# Patient Record
Sex: MALE | Race: WHITE | NOT HISPANIC OR LATINO | URBAN - METROPOLITAN AREA
[De-identification: names, ages, dates, MRNs, and addresses within clinical notes are randomized per-mention and may not be internally consistent; named-entity substitution may affect disease eponyms.]

---

## 2017-12-14 ENCOUNTER — EMERGENCY (EMERGENCY)
Facility: HOSPITAL | Age: 62
LOS: 1 days | Discharge: HOME | End: 2017-12-14
Attending: EMERGENCY MEDICINE
Payer: COMMERCIAL

## 2017-12-14 VITALS
RESPIRATION RATE: 20 BRPM | OXYGEN SATURATION: 97 % | HEIGHT: 72 IN | SYSTOLIC BLOOD PRESSURE: 148 MMHG | HEART RATE: 84 BPM | TEMPERATURE: 98 F | WEIGHT: 259.93 LBS | DIASTOLIC BLOOD PRESSURE: 102 MMHG

## 2017-12-14 VITALS
OXYGEN SATURATION: 97 % | DIASTOLIC BLOOD PRESSURE: 97 MMHG | TEMPERATURE: 98 F | HEART RATE: 83 BPM | SYSTOLIC BLOOD PRESSURE: 140 MMHG | RESPIRATION RATE: 16 BRPM

## 2017-12-14 LAB
ALBUMIN SERPL ELPH-MCNC: 3.4 G/DL — LOW (ref 3.5–5)
ALP SERPL-CCNC: 102 U/L — SIGNIFICANT CHANGE UP (ref 40–120)
ALT FLD-CCNC: 26 U/L DA — SIGNIFICANT CHANGE UP (ref 10–60)
ANION GAP SERPL CALC-SCNC: 9 MMOL/L — SIGNIFICANT CHANGE UP (ref 5–17)
APTT BLD: 33.5 SEC — SIGNIFICANT CHANGE UP (ref 27.5–37.4)
AST SERPL-CCNC: 17 U/L — SIGNIFICANT CHANGE UP (ref 10–40)
BASOPHILS # BLD AUTO: 0.1 K/UL — SIGNIFICANT CHANGE UP (ref 0–0.2)
BASOPHILS NFR BLD AUTO: 0.8 % — SIGNIFICANT CHANGE UP (ref 0–2)
BILIRUB SERPL-MCNC: 0.5 MG/DL — SIGNIFICANT CHANGE UP (ref 0.2–1.2)
BUN SERPL-MCNC: 10 MG/DL — SIGNIFICANT CHANGE UP (ref 7–18)
CALCIUM SERPL-MCNC: 8.6 MG/DL — SIGNIFICANT CHANGE UP (ref 8.4–10.5)
CHLORIDE SERPL-SCNC: 106 MMOL/L — SIGNIFICANT CHANGE UP (ref 96–108)
CO2 SERPL-SCNC: 25 MMOL/L — SIGNIFICANT CHANGE UP (ref 22–31)
CREAT SERPL-MCNC: 0.83 MG/DL — SIGNIFICANT CHANGE UP (ref 0.5–1.3)
EOSINOPHIL # BLD AUTO: 0.2 K/UL — SIGNIFICANT CHANGE UP (ref 0–0.5)
EOSINOPHIL NFR BLD AUTO: 2.7 % — SIGNIFICANT CHANGE UP (ref 0–6)
GLUCOSE SERPL-MCNC: 106 MG/DL — HIGH (ref 70–99)
HCT VFR BLD CALC: 52.8 % — HIGH (ref 39–50)
HGB BLD-MCNC: 16.8 G/DL — SIGNIFICANT CHANGE UP (ref 13–17)
INR BLD: 1.11 RATIO — SIGNIFICANT CHANGE UP (ref 0.88–1.16)
LYMPHOCYTES # BLD AUTO: 2.6 K/UL — SIGNIFICANT CHANGE UP (ref 1–3.3)
LYMPHOCYTES # BLD AUTO: 29.4 % — SIGNIFICANT CHANGE UP (ref 13–44)
MCHC RBC-ENTMCNC: 30.7 PG — SIGNIFICANT CHANGE UP (ref 27–34)
MCHC RBC-ENTMCNC: 31.8 GM/DL — LOW (ref 32–36)
MCV RBC AUTO: 96.4 FL — SIGNIFICANT CHANGE UP (ref 80–100)
MONOCYTES # BLD AUTO: 0.6 K/UL — SIGNIFICANT CHANGE UP (ref 0–0.9)
MONOCYTES NFR BLD AUTO: 6.3 % — SIGNIFICANT CHANGE UP (ref 2–14)
NEUTROPHILS # BLD AUTO: 5.4 K/UL — SIGNIFICANT CHANGE UP (ref 1.8–7.4)
NEUTROPHILS NFR BLD AUTO: 60.8 % — SIGNIFICANT CHANGE UP (ref 43–77)
PLATELET # BLD AUTO: 159 K/UL — SIGNIFICANT CHANGE UP (ref 150–400)
POTASSIUM SERPL-MCNC: 4.3 MMOL/L — SIGNIFICANT CHANGE UP (ref 3.5–5.3)
POTASSIUM SERPL-SCNC: 4.3 MMOL/L — SIGNIFICANT CHANGE UP (ref 3.5–5.3)
PROT SERPL-MCNC: 7.6 G/DL — SIGNIFICANT CHANGE UP (ref 6–8.3)
PROTHROM AB SERPL-ACNC: 12.1 SEC — SIGNIFICANT CHANGE UP (ref 9.8–12.7)
RBC # BLD: 5.48 M/UL — SIGNIFICANT CHANGE UP (ref 4.2–5.8)
RBC # FLD: 12.6 % — SIGNIFICANT CHANGE UP (ref 10.3–14.5)
SODIUM SERPL-SCNC: 140 MMOL/L — SIGNIFICANT CHANGE UP (ref 135–145)
TROPONIN I SERPL-MCNC: <0.015 NG/ML — SIGNIFICANT CHANGE UP (ref 0–0.04)
WBC # BLD: 8.9 K/UL — SIGNIFICANT CHANGE UP (ref 3.8–10.5)
WBC # FLD AUTO: 8.9 K/UL — SIGNIFICANT CHANGE UP (ref 3.8–10.5)

## 2017-12-14 PROCEDURE — 70450 CT HEAD/BRAIN W/O DYE: CPT

## 2017-12-14 PROCEDURE — 85610 PROTHROMBIN TIME: CPT

## 2017-12-14 PROCEDURE — 71046 X-RAY EXAM CHEST 2 VIEWS: CPT

## 2017-12-14 PROCEDURE — 70491 CT SOFT TISSUE NECK W/DYE: CPT | Mod: 26

## 2017-12-14 PROCEDURE — 85027 COMPLETE CBC AUTOMATED: CPT

## 2017-12-14 PROCEDURE — 99284 EMERGENCY DEPT VISIT MOD MDM: CPT

## 2017-12-14 PROCEDURE — 85730 THROMBOPLASTIN TIME PARTIAL: CPT

## 2017-12-14 PROCEDURE — 71020: CPT | Mod: 26

## 2017-12-14 PROCEDURE — 93005 ELECTROCARDIOGRAM TRACING: CPT

## 2017-12-14 PROCEDURE — 99284 EMERGENCY DEPT VISIT MOD MDM: CPT | Mod: 25

## 2017-12-14 PROCEDURE — 70450 CT HEAD/BRAIN W/O DYE: CPT | Mod: 26

## 2017-12-14 PROCEDURE — 70491 CT SOFT TISSUE NECK W/DYE: CPT

## 2017-12-14 PROCEDURE — 80053 COMPREHEN METABOLIC PANEL: CPT

## 2017-12-14 PROCEDURE — 84484 ASSAY OF TROPONIN QUANT: CPT

## 2017-12-14 RX ORDER — SODIUM CHLORIDE 9 MG/ML
3 INJECTION INTRAMUSCULAR; INTRAVENOUS; SUBCUTANEOUS ONCE
Qty: 0 | Refills: 0 | Status: COMPLETED | OUTPATIENT
Start: 2017-12-14 | End: 2017-12-14

## 2017-12-14 RX ADMIN — SODIUM CHLORIDE 3 MILLILITER(S): 9 INJECTION INTRAMUSCULAR; INTRAVENOUS; SUBCUTANEOUS at 10:56

## 2017-12-14 NOTE — ED PROVIDER NOTE - MUSCULOSKELETAL, MLM
Spine appears normal, range of motion is not limited. 10 cm x 6 cm mass to the right proximal mandible

## 2017-12-14 NOTE — ED PROVIDER NOTE - OBJECTIVE STATEMENT
63 y/o M pt with no significant PMHx presents to ED c/o headache, dizziness, with nausea for many months. 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 Pt states that he visits today because his dizziness is getting worse. Pt denies fever, chills, shortness of breath, cough, diarrhea, dysuria, urinary frequency, hematuria, night sweats, weight loss, numbness, tingling, weakness, visual changes, or any other complaints. NKDA.

## 2017-12-14 NOTE — ED PROVIDER NOTE - PROGRESS NOTE DETAILS
ct labs noted, patient given copy of results. DW Dr Romero who recommended DC and follow up at ENT clinic for FNA  [id number].  Precautions reviewed with pt.

## 2017-12-14 NOTE — ED PROVIDER NOTE - CHPI ED SYMPTOMS NEG
no fever, no chills, no visual changes, no shortness of breath, no cough, no diarrhea, no dysuria, no urinary frequency, no hematuria, no night sweats, no weight loss, no numbness, no tingling, no weakness

## 2017-12-22 PROBLEM — Z00.00 ENCOUNTER FOR PREVENTIVE HEALTH EXAMINATION: Status: ACTIVE | Noted: 2017-12-22

## 2017-12-28 ENCOUNTER — APPOINTMENT (OUTPATIENT)
Dept: OTOLARYNGOLOGY | Facility: CLINIC | Age: 62
End: 2017-12-28
Payer: COMMERCIAL

## 2017-12-28 ENCOUNTER — OUTPATIENT (OUTPATIENT)
Dept: OUTPATIENT SERVICES | Facility: HOSPITAL | Age: 62
LOS: 1 days | Discharge: HOME | End: 2017-12-28
Payer: COMMERCIAL

## 2017-12-28 VITALS
HEIGHT: 72 IN | SYSTOLIC BLOOD PRESSURE: 129 MMHG | BODY MASS INDEX: 35.21 KG/M2 | HEART RATE: 76 BPM | WEIGHT: 260 LBS | DIASTOLIC BLOOD PRESSURE: 85 MMHG

## 2017-12-28 DIAGNOSIS — Z86.39 PERSONAL HISTORY OF OTHER ENDOCRINE, NUTRITIONAL AND METABOLIC DISEASE: ICD-10-CM

## 2017-12-28 DIAGNOSIS — Z86.79 PERSONAL HISTORY OF OTHER DISEASES OF THE CIRCULATORY SYSTEM: ICD-10-CM

## 2017-12-28 DIAGNOSIS — Z80.8 FAMILY HISTORY OF MALIGNANT NEOPLASM OF OTHER ORGANS OR SYSTEMS: ICD-10-CM

## 2017-12-28 DIAGNOSIS — F17.200 NICOTINE DEPENDENCE, UNSPECIFIED, UNCOMPLICATED: ICD-10-CM

## 2017-12-28 DIAGNOSIS — K11.9 DISEASE OF SALIVARY GLAND, UNSPECIFIED: ICD-10-CM

## 2017-12-28 PROCEDURE — 99204 OFFICE O/P NEW MOD 45 MIN: CPT

## 2017-12-28 RX ORDER — ATENOLOL 100 MG/1
100 TABLET ORAL
Refills: 0 | Status: ACTIVE | COMMUNITY

## 2018-01-02 DIAGNOSIS — K11.9 DISEASE OF SALIVARY GLAND, UNSPECIFIED: ICD-10-CM

## 2018-01-10 ENCOUNTER — FORM ENCOUNTER (OUTPATIENT)
Age: 63
End: 2018-01-10

## 2018-01-11 ENCOUNTER — APPOINTMENT (OUTPATIENT)
Dept: ULTRASOUND IMAGING | Facility: IMAGING CENTER | Age: 63
End: 2018-01-11
Payer: COMMERCIAL

## 2018-01-11 ENCOUNTER — OUTPATIENT (OUTPATIENT)
Dept: OUTPATIENT SERVICES | Facility: HOSPITAL | Age: 63
LOS: 1 days | End: 2018-01-11
Payer: COMMERCIAL

## 2018-01-11 DIAGNOSIS — K11.9 DISEASE OF SALIVARY GLAND, UNSPECIFIED: ICD-10-CM

## 2018-01-11 PROCEDURE — 88172 CYTP DX EVAL FNA 1ST EA SITE: CPT

## 2018-01-11 PROCEDURE — 76942 ECHO GUIDE FOR BIOPSY: CPT | Mod: 26

## 2018-01-11 PROCEDURE — 10022: CPT

## 2018-01-11 PROCEDURE — 88305 TISSUE EXAM BY PATHOLOGIST: CPT

## 2018-01-11 PROCEDURE — 88305 TISSUE EXAM BY PATHOLOGIST: CPT | Mod: 26

## 2018-01-11 PROCEDURE — 76942 ECHO GUIDE FOR BIOPSY: CPT

## 2018-01-11 PROCEDURE — 88173 CYTOPATH EVAL FNA REPORT: CPT

## 2018-01-11 PROCEDURE — 88173 CYTOPATH EVAL FNA REPORT: CPT | Mod: 26

## 2018-01-19 ENCOUNTER — OUTPATIENT (OUTPATIENT)
Dept: OUTPATIENT SERVICES | Facility: HOSPITAL | Age: 63
LOS: 1 days | End: 2018-01-19
Payer: COMMERCIAL

## 2018-01-19 VITALS
WEIGHT: 291.01 LBS | SYSTOLIC BLOOD PRESSURE: 130 MMHG | TEMPERATURE: 98 F | HEART RATE: 62 BPM | HEIGHT: 70 IN | RESPIRATION RATE: 16 BRPM | DIASTOLIC BLOOD PRESSURE: 88 MMHG

## 2018-01-19 DIAGNOSIS — L72.9 FOLLICULAR CYST OF THE SKIN AND SUBCUTANEOUS TISSUE, UNSPECIFIED: Chronic | ICD-10-CM

## 2018-01-19 DIAGNOSIS — I83.90 ASYMPTOMATIC VARICOSE VEINS OF UNSPECIFIED LOWER EXTREMITY: Chronic | ICD-10-CM

## 2018-01-19 DIAGNOSIS — K11.9 DISEASE OF SALIVARY GLAND, UNSPECIFIED: ICD-10-CM

## 2018-01-19 DIAGNOSIS — R06.02 SHORTNESS OF BREATH: ICD-10-CM

## 2018-01-19 DIAGNOSIS — Z98.890 OTHER SPECIFIED POSTPROCEDURAL STATES: Chronic | ICD-10-CM

## 2018-01-19 DIAGNOSIS — Z90.49 ACQUIRED ABSENCE OF OTHER SPECIFIED PARTS OF DIGESTIVE TRACT: Chronic | ICD-10-CM

## 2018-01-19 LAB
BLD GP AB SCN SERPL QL: NEGATIVE — SIGNIFICANT CHANGE UP
BUN SERPL-MCNC: 14 MG/DL — SIGNIFICANT CHANGE UP (ref 7–23)
CALCIUM SERPL-MCNC: 9.9 MG/DL — SIGNIFICANT CHANGE UP (ref 8.4–10.5)
CHLORIDE SERPL-SCNC: 101 MMOL/L — SIGNIFICANT CHANGE UP (ref 98–107)
CO2 SERPL-SCNC: 29 MMOL/L — SIGNIFICANT CHANGE UP (ref 22–31)
CREAT SERPL-MCNC: 0.92 MG/DL — SIGNIFICANT CHANGE UP (ref 0.5–1.3)
GLUCOSE SERPL-MCNC: 85 MG/DL — SIGNIFICANT CHANGE UP (ref 70–99)
HCT VFR BLD CALC: 50.1 % — HIGH (ref 39–50)
HGB BLD-MCNC: 16.4 G/DL — SIGNIFICANT CHANGE UP (ref 13–17)
MCHC RBC-ENTMCNC: 32 PG — SIGNIFICANT CHANGE UP (ref 27–34)
MCHC RBC-ENTMCNC: 32.7 % — SIGNIFICANT CHANGE UP (ref 32–36)
MCV RBC AUTO: 97.7 FL — SIGNIFICANT CHANGE UP (ref 80–100)
NRBC # FLD: 0 — SIGNIFICANT CHANGE UP
PLATELET # BLD AUTO: 170 K/UL — SIGNIFICANT CHANGE UP (ref 150–400)
PMV BLD: 12.5 FL — SIGNIFICANT CHANGE UP (ref 7–13)
POTASSIUM SERPL-MCNC: 5.2 MMOL/L — SIGNIFICANT CHANGE UP (ref 3.5–5.3)
POTASSIUM SERPL-SCNC: 5.2 MMOL/L — SIGNIFICANT CHANGE UP (ref 3.5–5.3)
RBC # BLD: 5.13 M/UL — SIGNIFICANT CHANGE UP (ref 4.2–5.8)
RBC # FLD: 13.5 % — SIGNIFICANT CHANGE UP (ref 10.3–14.5)
RH IG SCN BLD-IMP: POSITIVE — SIGNIFICANT CHANGE UP
SODIUM SERPL-SCNC: 143 MMOL/L — SIGNIFICANT CHANGE UP (ref 135–145)
WBC # BLD: 9.99 K/UL — SIGNIFICANT CHANGE UP (ref 3.8–10.5)
WBC # FLD AUTO: 9.99 K/UL — SIGNIFICANT CHANGE UP (ref 3.8–10.5)

## 2018-01-19 PROCEDURE — 93010 ELECTROCARDIOGRAM REPORT: CPT

## 2018-01-19 RX ORDER — SODIUM CHLORIDE 9 MG/ML
1000 INJECTION, SOLUTION INTRAVENOUS
Qty: 0 | Refills: 0 | Status: DISCONTINUED | OUTPATIENT
Start: 2018-01-24 | End: 2018-01-25

## 2018-01-19 NOTE — H&P PST ADULT - MUSCULOSKELETAL
details… detailed exam no calf tenderness/no joint swelling/ROM intact/no joint erythema/normal strength/no joint warmth

## 2018-01-19 NOTE — H&P PST ADULT - PMH
Disease of salivary gland    DVT (deep venous thrombosis)  2012 treated  HLD (hyperlipidemia)    HTN (hypertension)    Obesity

## 2018-01-19 NOTE — H&P PST ADULT - NEGATIVE ENMT SYMPTOMS
no hearing difficulty/no vertigo/no throat pain/no dysphagia/no ear pain/no sinus symptoms/no nose bleeds/no tinnitus

## 2018-01-19 NOTE — H&P PST ADULT - RS GEN PE MLT RESP DETAILS PC
no rhonchi/clear to auscultation bilaterally/respirations non-labored/good air movement/no rales/no chest wall tenderness/no wheezes/breath sounds equal/airway patent

## 2018-01-19 NOTE — H&P PST ADULT - PROBLEM SELECTOR PLAN 1
Pt is scheduled for right parotidectomy, alloderm reconstruction, possible neck dissection for 1/24/18. Preop instructions, pepcid, surgical scrub provided. Pt stated understanding. LUCAS precaution, OR booking notified. Pt states he already was evaluated by PMD. Pending medical evaluation.

## 2018-01-19 NOTE — H&P PST ADULT - ITE SK HX ROS MEA POS PC
left temporal cyst - excised in 2016, reopened briefly after excision, and it has been open since, pt occasionally uses cream, leaves open to air

## 2018-01-19 NOTE — H&P PST ADULT - NEGATIVE OPHTHALMOLOGIC SYMPTOMS
no discharge L/no pain L/no loss of vision R/no blurred vision L/no blurred vision R/no diplopia/no pain R/no loss of vision L/no discharge R

## 2018-01-19 NOTE — H&P PST ADULT - NEGATIVE NEUROLOGICAL SYMPTOMS
no weakness/no generalized seizures/no headache/no transient paralysis/no paresthesias/no vertigo/no loss of sensation/no focal seizures/no difficulty walking/no tremors/no syncope

## 2018-01-19 NOTE — H&P PST ADULT - PROBLEM SELECTOR PLAN 2
with exertion, cardiac ablation in 2013, has not seen a cardiologist since. Requested cardiac evaluation. Pt referred to Dr Hoover or Dr Zheng if cannot make an appt with a [place] cardiologist. Pt states he will call back with name. Left voicemail with surgical coordinator Karen. with exertion, cardiac ablation in 2013, has not seen a cardiologist since. Requested cardiac evaluation. Pt made appt with Dr Sheppard cardiologist on 1/23/18. Left voicemail with surgical coordinator Karen informing of cardiac evaluation.

## 2018-01-19 NOTE — H&P PST ADULT - HISTORY OF PRESENT ILLNESS
62 year old male presents to presurgical testing with diagnosis of disease of salivary gland unspecified scheduled for right parotidectomy, alloderm reconstruction, possible neck dissection for 1/24/18. Pt reports right neck nodule for about 6 months. Completed sonogram and biopsy. Recommended surgical intervention. Pt denies dysphagia, or hoarseness. Reports SOB of breath on exertion which is chronic and stable.

## 2018-01-19 NOTE — H&P PST ADULT - PSH
H/O cardiac radiofrequency ablation  2013 for "palpitations"  S/P appendectomy    Skin cyst  excision of left temporal cyst in 2016  Varicose veins  left leg surgery

## 2018-01-24 ENCOUNTER — RESULT REVIEW (OUTPATIENT)
Age: 63
End: 2018-01-24

## 2018-01-24 ENCOUNTER — APPOINTMENT (OUTPATIENT)
Dept: OTOLARYNGOLOGY | Facility: HOSPITAL | Age: 63
End: 2018-01-24

## 2018-01-24 ENCOUNTER — INPATIENT (INPATIENT)
Facility: HOSPITAL | Age: 63
LOS: 0 days | Discharge: HOME | End: 2018-01-25
Attending: OTOLARYNGOLOGY | Admitting: OTOLARYNGOLOGY
Payer: COMMERCIAL

## 2018-01-24 VITALS
OXYGEN SATURATION: 94 % | SYSTOLIC BLOOD PRESSURE: 149 MMHG | TEMPERATURE: 98 F | HEIGHT: 70 IN | DIASTOLIC BLOOD PRESSURE: 88 MMHG | WEIGHT: 291.01 LBS | RESPIRATION RATE: 18 BRPM | HEART RATE: 78 BPM

## 2018-01-24 DIAGNOSIS — K11.9 DISEASE OF SALIVARY GLAND, UNSPECIFIED: ICD-10-CM

## 2018-01-24 DIAGNOSIS — I83.90 ASYMPTOMATIC VARICOSE VEINS OF UNSPECIFIED LOWER EXTREMITY: Chronic | ICD-10-CM

## 2018-01-24 DIAGNOSIS — Z98.890 OTHER SPECIFIED POSTPROCEDURAL STATES: Chronic | ICD-10-CM

## 2018-01-24 DIAGNOSIS — Z90.49 ACQUIRED ABSENCE OF OTHER SPECIFIED PARTS OF DIGESTIVE TRACT: Chronic | ICD-10-CM

## 2018-01-24 DIAGNOSIS — L72.9 FOLLICULAR CYST OF THE SKIN AND SUBCUTANEOUS TISSUE, UNSPECIFIED: Chronic | ICD-10-CM

## 2018-01-24 LAB
BLD GP AB SCN SERPL QL: NEGATIVE — SIGNIFICANT CHANGE UP
RH IG SCN BLD-IMP: POSITIVE — SIGNIFICANT CHANGE UP

## 2018-01-24 PROCEDURE — 42415 EXCISE PAROTID GLAND/LESION: CPT

## 2018-01-24 PROCEDURE — 15777 ACELLULAR DERM MATRIX IMPLT: CPT

## 2018-01-24 PROCEDURE — 13131 CMPLX RPR F/C/C/M/N/AX/G/H/F: CPT

## 2018-01-24 PROCEDURE — 88307 TISSUE EXAM BY PATHOLOGIST: CPT | Mod: 26

## 2018-01-24 RX ORDER — ONDANSETRON 8 MG/1
4 TABLET, FILM COATED ORAL ONCE
Qty: 0 | Refills: 0 | Status: DISCONTINUED | OUTPATIENT
Start: 2018-01-24 | End: 2018-01-24

## 2018-01-24 RX ORDER — HYDROMORPHONE HYDROCHLORIDE 2 MG/ML
0.5 INJECTION INTRAMUSCULAR; INTRAVENOUS; SUBCUTANEOUS
Qty: 0 | Refills: 0 | Status: DISCONTINUED | OUTPATIENT
Start: 2018-01-24 | End: 2018-01-24

## 2018-01-24 RX ORDER — ATENOLOL 25 MG/1
100 TABLET ORAL DAILY
Qty: 0 | Refills: 0 | Status: DISCONTINUED | OUTPATIENT
Start: 2018-01-24 | End: 2018-01-25

## 2018-01-24 RX ORDER — OXYCODONE AND ACETAMINOPHEN 5; 325 MG/1; MG/1
1 TABLET ORAL EVERY 4 HOURS
Qty: 0 | Refills: 0 | Status: DISCONTINUED | OUTPATIENT
Start: 2018-01-24 | End: 2018-01-25

## 2018-01-24 RX ORDER — OXYCODONE AND ACETAMINOPHEN 5; 325 MG/1; MG/1
2 TABLET ORAL EVERY 6 HOURS
Qty: 0 | Refills: 0 | Status: DISCONTINUED | OUTPATIENT
Start: 2018-01-24 | End: 2018-01-25

## 2018-01-24 RX ORDER — ATENOLOL 25 MG/1
1 TABLET ORAL
Qty: 0 | Refills: 0 | COMMUNITY

## 2018-01-24 RX ORDER — ACETAMINOPHEN 500 MG
650 TABLET ORAL EVERY 6 HOURS
Qty: 0 | Refills: 0 | Status: DISCONTINUED | OUTPATIENT
Start: 2018-01-24 | End: 2018-01-25

## 2018-01-24 RX ORDER — ATENOLOL 25 MG/1
100 TABLET ORAL DAILY
Qty: 0 | Refills: 0 | Status: DISCONTINUED | OUTPATIENT
Start: 2018-01-24 | End: 2018-01-24

## 2018-01-24 RX ORDER — INFLUENZA VIRUS VACCINE 15; 15; 15; 15 UG/.5ML; UG/.5ML; UG/.5ML; UG/.5ML
0.5 SUSPENSION INTRAMUSCULAR ONCE
Qty: 0 | Refills: 0 | Status: COMPLETED | OUTPATIENT
Start: 2018-01-24 | End: 2018-01-25

## 2018-01-24 RX ORDER — ATORVASTATIN CALCIUM 80 MG/1
20 TABLET, FILM COATED ORAL AT BEDTIME
Qty: 0 | Refills: 0 | Status: DISCONTINUED | OUTPATIENT
Start: 2018-01-24 | End: 2018-01-25

## 2018-01-24 RX ORDER — ATORVASTATIN CALCIUM 80 MG/1
1 TABLET, FILM COATED ORAL
Qty: 0 | Refills: 0 | COMMUNITY

## 2018-01-24 RX ORDER — CEPHALEXIN 500 MG
500 CAPSULE ORAL EVERY 12 HOURS
Qty: 0 | Refills: 0 | Status: DISCONTINUED | OUTPATIENT
Start: 2018-01-24 | End: 2018-01-25

## 2018-01-24 RX ORDER — FENTANYL CITRATE 50 UG/ML
25 INJECTION INTRAVENOUS
Qty: 0 | Refills: 0 | Status: DISCONTINUED | OUTPATIENT
Start: 2018-01-24 | End: 2018-01-24

## 2018-01-24 RX ADMIN — HYDROMORPHONE HYDROCHLORIDE 0.5 MILLIGRAM(S): 2 INJECTION INTRAMUSCULAR; INTRAVENOUS; SUBCUTANEOUS at 15:30

## 2018-01-24 RX ADMIN — ATORVASTATIN CALCIUM 20 MILLIGRAM(S): 80 TABLET, FILM COATED ORAL at 22:28

## 2018-01-24 RX ADMIN — HYDROMORPHONE HYDROCHLORIDE 0.5 MILLIGRAM(S): 2 INJECTION INTRAMUSCULAR; INTRAVENOUS; SUBCUTANEOUS at 15:41

## 2018-01-24 RX ADMIN — FENTANYL CITRATE 25 MICROGRAM(S): 50 INJECTION INTRAVENOUS at 16:30

## 2018-01-24 RX ADMIN — FENTANYL CITRATE 25 MICROGRAM(S): 50 INJECTION INTRAVENOUS at 16:45

## 2018-01-24 RX ADMIN — HYDROMORPHONE HYDROCHLORIDE 0.5 MILLIGRAM(S): 2 INJECTION INTRAMUSCULAR; INTRAVENOUS; SUBCUTANEOUS at 15:15

## 2018-01-24 RX ADMIN — SODIUM CHLORIDE 30 MILLILITER(S): 9 INJECTION, SOLUTION INTRAVENOUS at 14:59

## 2018-01-24 RX ADMIN — Medication 500 MILLIGRAM(S): at 19:04

## 2018-01-24 RX ADMIN — HYDROMORPHONE HYDROCHLORIDE 0.5 MILLIGRAM(S): 2 INJECTION INTRAMUSCULAR; INTRAVENOUS; SUBCUTANEOUS at 16:00

## 2018-01-24 RX ADMIN — FENTANYL CITRATE 25 MICROGRAM(S): 50 INJECTION INTRAVENOUS at 16:15

## 2018-01-24 RX ADMIN — HYDROMORPHONE HYDROCHLORIDE 0.5 MILLIGRAM(S): 2 INJECTION INTRAMUSCULAR; INTRAVENOUS; SUBCUTANEOUS at 15:00

## 2018-01-25 ENCOUNTER — TRANSCRIPTION ENCOUNTER (OUTPATIENT)
Age: 63
End: 2018-01-25

## 2018-01-25 VITALS
OXYGEN SATURATION: 91 % | TEMPERATURE: 98 F | HEART RATE: 73 BPM | SYSTOLIC BLOOD PRESSURE: 130 MMHG | DIASTOLIC BLOOD PRESSURE: 81 MMHG

## 2018-01-25 RX ORDER — CEPHALEXIN 500 MG
1 CAPSULE ORAL
Qty: 14 | Refills: 0 | OUTPATIENT
Start: 2018-01-25 | End: 2018-01-31

## 2018-01-25 RX ADMIN — Medication 500 MILLIGRAM(S): at 05:56

## 2018-01-25 RX ADMIN — INFLUENZA VIRUS VACCINE 0.5 MILLILITER(S): 15; 15; 15; 15 SUSPENSION INTRAMUSCULAR at 10:04

## 2018-01-25 RX ADMIN — ATENOLOL 100 MILLIGRAM(S): 25 TABLET ORAL at 05:56

## 2018-01-25 NOTE — DISCHARGE NOTE ADULT - CARE PROVIDERS DIRECT ADDRESSES
,[email or web address], [email or web address]

## 2018-01-25 NOTE — DISCHARGE NOTE ADULT - PATIENT PORTAL LINK FT
“You can access the FollowHealth Patient Portal, offered by [facility], by registering with the following website: [email or web address]”

## 2018-01-25 NOTE — DISCHARGE NOTE ADULT - CARE PLAN
Principal Discharge DX:	Disease of salivary gland  Goal:	parotidectomy  Assessment and plan of treatment:	same

## 2018-01-25 NOTE — DISCHARGE NOTE ADULT - MEDICATION SUMMARY - MEDICATIONS TO TAKE
I will START or STAY ON the medications listed below when I get home from the hospital:    oxyCODONE-acetaminophen 5 mg-325 mg oral tablet  -- 1 tab(s) by mouth every 4 hours, As needed, Moderate Pain MDD:6  -- Indication: For pain    atorvastatin 20 mg oral tablet  -- 1 tab(s) by mouth once a day (pt takes this 4-5 times a week)  -- Indication: For cholesterol    atenolol 100 mg oral tablet  -- 1 tab(s) by mouth once a day am  -- Indication: For HTN (hypertension)    cephalexin 500 mg oral capsule  -- 1 cap(s) by mouth every 12 hours  -- Indication: For prophylaxis

## 2018-01-25 NOTE — PROGRESS NOTE ADULT - SUBJECTIVE AND OBJECTIVE BOX
did well overnight no issues  pain well controlled    no bleeding from surgical site  neck incision c/d/i  JPoutput 22.5, minimal SS ooze  neck soft flat  CN 2-12 grossly intact    A/P:  s/p R parotidectomy  -PO  -pain control  -MARLINE output  -abx while marline in place  -will d/w attending

## 2018-01-25 NOTE — DISCHARGE NOTE ADULT - CARE PROVIDER_API CALL
Fei Albarado), Otolaryngology  [id number] [address] [place] [address]  Phone: [phone]  Fax: [phone]

## 2018-01-25 NOTE — DISCHARGE NOTE ADULT - INSTRUCTIONS
Please NOTIFY MD for any of the following s/s: S/S infection (Fever >100.4, chills, increased redness, increased bleeding, pus-like drainage from incision line), uncontrolled pain not relieved by pain medications, persistent nausea/vomiting or inability to tolerate diet. No heavy lifting; No driving if/while taking narcotic pain medications. Please drink 6-8 glasses of water daily to stay hydrated.

## 2018-01-25 NOTE — DISCHARGE NOTE ADULT - CONDITIONS AT DISCHARGE
Pt A&Ox4. Vs stable. Pain well controlled. R. neck incision clean and dry and free from s/s infection with steri-strips CHERI. R. neck KECIA removed by P.A. Pt OOB, ambulating in room and on unit, tolerating PO diet, and voiding adequately.

## 2018-02-08 ENCOUNTER — APPOINTMENT (OUTPATIENT)
Dept: OTOLARYNGOLOGY | Facility: CLINIC | Age: 63
End: 2018-02-08
Payer: COMMERCIAL

## 2018-02-08 VITALS
BODY MASS INDEX: 35.21 KG/M2 | HEART RATE: 71 BPM | HEIGHT: 72 IN | WEIGHT: 260 LBS | SYSTOLIC BLOOD PRESSURE: 143 MMHG | DIASTOLIC BLOOD PRESSURE: 93 MMHG

## 2018-02-08 VITALS — WEIGHT: 260 LBS | BODY MASS INDEX: 35.21 KG/M2 | HEIGHT: 72 IN

## 2018-02-08 PROCEDURE — 99024 POSTOP FOLLOW-UP VISIT: CPT

## 2018-02-09 DIAGNOSIS — D49.0 NEOPLASM OF UNSPECIFIED BEHAVIOR OF DIGESTIVE SYSTEM: ICD-10-CM

## 2018-05-03 ENCOUNTER — APPOINTMENT (OUTPATIENT)
Dept: OTOLARYNGOLOGY | Facility: CLINIC | Age: 63
End: 2018-05-03
Payer: COMMERCIAL

## 2018-05-03 VITALS
BODY MASS INDEX: 35.21 KG/M2 | WEIGHT: 260 LBS | HEIGHT: 72 IN | DIASTOLIC BLOOD PRESSURE: 84 MMHG | HEART RATE: 79 BPM | SYSTOLIC BLOOD PRESSURE: 131 MMHG

## 2018-05-03 PROCEDURE — 99214 OFFICE O/P EST MOD 30 MIN: CPT

## 2018-05-10 DIAGNOSIS — H55.09 OTHER FORMS OF NYSTAGMUS: ICD-10-CM

## 2018-05-10 DIAGNOSIS — H92.09 OTALGIA, UNSPECIFIED EAR: ICD-10-CM

## 2018-05-10 DIAGNOSIS — R42 DIZZINESS AND GIDDINESS: ICD-10-CM

## 2018-05-10 DIAGNOSIS — H93.19 TINNITUS, UNSPECIFIED EAR: ICD-10-CM

## 2018-05-10 DIAGNOSIS — M26.609 UNSPECIFIED TEMPOROMANDIBULAR JOINT DISORDER, UNSPECIFIED SIDE: ICD-10-CM

## 2018-05-10 DIAGNOSIS — H90.42 SENSORINEURAL HEARING LOSS, UNILATERAL, LEFT EAR, WITH UNRESTRICTED HEARING ON THE CONTRALATERAL SIDE: ICD-10-CM

## 2018-11-02 PROBLEM — E78.5 HYPERLIPIDEMIA, UNSPECIFIED: Chronic | Status: ACTIVE | Noted: 2018-01-19

## 2018-11-02 PROBLEM — E66.9 OBESITY, UNSPECIFIED: Chronic | Status: ACTIVE | Noted: 2018-01-19

## 2018-11-02 PROBLEM — K11.9 DISEASE OF SALIVARY GLAND, UNSPECIFIED: Chronic | Status: ACTIVE | Noted: 2018-01-19

## 2018-11-02 PROBLEM — I10 ESSENTIAL (PRIMARY) HYPERTENSION: Chronic | Status: ACTIVE | Noted: 2018-01-19

## 2018-11-02 PROBLEM — I82.409 ACUTE EMBOLISM AND THROMBOSIS OF UNSPECIFIED DEEP VEINS OF UNSPECIFIED LOWER EXTREMITY: Chronic | Status: ACTIVE | Noted: 2018-01-19

## 2018-11-15 ENCOUNTER — OUTPATIENT (OUTPATIENT)
Dept: OUTPATIENT SERVICES | Facility: HOSPITAL | Age: 63
LOS: 1 days | Discharge: HOME | End: 2018-11-15

## 2018-11-15 ENCOUNTER — APPOINTMENT (OUTPATIENT)
Dept: OTOLARYNGOLOGY | Facility: CLINIC | Age: 63
End: 2018-11-15
Payer: COMMERCIAL

## 2018-11-15 VITALS
HEART RATE: 78 BPM | DIASTOLIC BLOOD PRESSURE: 78 MMHG | SYSTOLIC BLOOD PRESSURE: 149 MMHG | BODY MASS INDEX: 35.4 KG/M2 | WEIGHT: 261 LBS

## 2018-11-15 DIAGNOSIS — F17.200 NICOTINE DEPENDENCE, UNSPECIFIED, UNCOMPLICATED: ICD-10-CM

## 2018-11-15 DIAGNOSIS — Z90.49 ACQUIRED ABSENCE OF OTHER SPECIFIED PARTS OF DIGESTIVE TRACT: Chronic | ICD-10-CM

## 2018-11-15 DIAGNOSIS — I83.90 ASYMPTOMATIC VARICOSE VEINS OF UNSPECIFIED LOWER EXTREMITY: Chronic | ICD-10-CM

## 2018-11-15 DIAGNOSIS — Z98.890 OTHER SPECIFIED POSTPROCEDURAL STATES: Chronic | ICD-10-CM

## 2018-11-15 DIAGNOSIS — L72.9 FOLLICULAR CYST OF THE SKIN AND SUBCUTANEOUS TISSUE, UNSPECIFIED: Chronic | ICD-10-CM

## 2018-11-15 PROCEDURE — 99213 OFFICE O/P EST LOW 20 MIN: CPT

## 2018-11-28 DIAGNOSIS — D49.0 NEOPLASM OF UNSPECIFIED BEHAVIOR OF DIGESTIVE SYSTEM: ICD-10-CM

## 2019-05-16 ENCOUNTER — APPOINTMENT (OUTPATIENT)
Dept: OTOLARYNGOLOGY | Facility: CLINIC | Age: 64
End: 2019-05-16
Payer: COMMERCIAL

## 2019-05-16 VITALS
SYSTOLIC BLOOD PRESSURE: 126 MMHG | BODY MASS INDEX: 37.93 KG/M2 | WEIGHT: 280 LBS | DIASTOLIC BLOOD PRESSURE: 87 MMHG | HEIGHT: 72 IN | HEART RATE: 110 BPM

## 2019-05-16 PROCEDURE — 99214 OFFICE O/P EST MOD 30 MIN: CPT

## 2019-05-16 RX ORDER — ASPIRIN 81 MG/1
81 TABLET, CHEWABLE ORAL
Refills: 0 | Status: ACTIVE | COMMUNITY

## 2019-05-16 RX ORDER — ATORVASTATIN CALCIUM 20 MG/1
20 TABLET, FILM COATED ORAL
Refills: 0 | Status: COMPLETED | COMMUNITY
End: 2019-05-16

## 2019-05-22 ENCOUNTER — APPOINTMENT (OUTPATIENT)
Dept: CT IMAGING | Facility: IMAGING CENTER | Age: 64
End: 2019-05-22

## 2019-05-22 ENCOUNTER — APPOINTMENT (OUTPATIENT)
Dept: ULTRASOUND IMAGING | Facility: IMAGING CENTER | Age: 64
End: 2019-05-22

## 2019-05-24 NOTE — PHYSICAL EXAM
[Midline] : trachea located in midline position [Normal] : no rashes [de-identified] : Incision well healed. KIRT. Presence of a 2 cm mobile L inferior parotid nodule. [FreeTextEntry2] : 3 cm mobile, nontender R tail of the parotid mass.

## 2019-05-24 NOTE — HISTORY OF PRESENT ILLNESS
[de-identified] : 63M S/P R parotidectomy for [place] tumor on 1/24/18 presents for 6 month follow up.\par Pt states he is doing well and healing well. Feels sensation around the ear has returned although not completely. Otherwise no other complaints, denies swelling, pain, drainage. \par Now c/o L (contralateral) parotid nodule.\par Complete review of systems which was performed during a previous encounter was reviewed with the patient and there are no changes except as stated in the HPI section.\par

## 2019-05-24 NOTE — REASON FOR VISIT
[Subsequent Evaluation] : a subsequent evaluation for [FreeTextEntry2] : patient is here with daughter and states patient is here for a 6 months follow up for throat

## 2019-12-11 NOTE — H&P PST ADULT - TEACHING/LEARNING CULTURAL CONSIDERATIONS
Sinusitis, Adult  Sinusitis is soreness and swelling (inflammation) of your sinuses. Sinuses are hollow spaces in the bones around your face. They are located:  · Around your eyes.  · In the middle of your forehead.  · Behind your nose.  · In your cheekbones.  Your sinuses and nasal passages are lined with a fluid called mucus. Mucus drains out of your sinuses. Swelling can trap mucus in your sinuses. This lets germs (bacteria, virus, or fungus) grow, which leads to infection. Most of the time, this condition is caused by a virus.  What are the causes?  This condition is caused by:  · Allergies.  · Asthma.  · Germs.  · Things that block your nose or sinuses.  · Growths in the nose (nasal polyps).  · Chemicals or irritants in the air.  · Fungus (rare).  What increases the risk?  You are more likely to develop this condition if:  · You have a weak body defense system (immune system).  · You do a lot of swimming or diving.  · You use nasal sprays too much.  · You smoke.  What are the signs or symptoms?  The main symptoms of this condition are pain and a feeling of pressure around the sinuses. Other symptoms include:  · Stuffy nose (congestion).  · Runny nose (drainage).  · Swelling and warmth in the sinuses.  · Headache.  · Toothache.  · A cough that may get worse at night.  · Mucus that collects in the throat or the back of the nose (postnasal drip).  · Being unable to smell and taste.  · Being very tired (fatigue).  · A fever.  · Sore throat.  · Bad breath.  How is this diagnosed?  This condition is diagnosed based on:  · Your symptoms.  · Your medical history.  · A physical exam.  · Tests to find out if your condition is short-term (acute) or long-term (chronic). Your doctor may:  ? Check your nose for growths (polyps).  ? Check your sinuses using a tool that has a light (endoscope).  ? Check for allergies or germs.  ? Do imaging tests, such as an MRI or CT scan.  How is this treated?  Treatment for this condition  depends on the cause and whether it is short-term or long-term.  · If caused by a virus, your symptoms should go away on their own within 10 days. You may be given medicines to relieve symptoms. They include:  ? Medicines that shrink swollen tissue in the nose.  ? Medicines that treat allergies (antihistamines).  ? A spray that treats swelling of the nostrils.   ? Rinses that help get rid of thick mucus in your nose (nasal saline washes).  · If caused by bacteria, your doctor may wait to see if you will get better without treatment. You may be given antibiotic medicine if you have:  ? A very bad infection.  ? A weak body defense system.  · If caused by growths in the nose, you may need to have surgery.  Follow these instructions at home:  Medicines  · Take, use, or apply over-the-counter and prescription medicines only as told by your doctor. These may include nasal sprays.  · If you were prescribed an antibiotic medicine, take it as told by your doctor. Do not stop taking the antibiotic even if you start to feel better.  Hydrate and humidify    · Drink enough water to keep your pee (urine) pale yellow.  · Use a cool mist humidifier to keep the humidity level in your home above 50%.  · Breathe in steam for 10-15 minutes, 3-4 times a day, or as told by your doctor. You can do this in the bathroom while a hot shower is running.  · Try not to spend time in cool or dry air.  Rest  · Rest as much as you can.  · Sleep with your head raised (elevated).  · Make sure you get enough sleep each night.  General instructions    · Put a warm, moist washcloth on your face 3-4 times a day, or as often as told by your doctor. This will help with discomfort.  · Wash your hands often with soap and water. If there is no soap and water, use hand .  · Do not smoke. Avoid being around people who are smoking (secondhand smoke).  · Keep all follow-up visits as told by your doctor. This is important.  Contact a doctor if:  · You  have a fever.  · Your symptoms get worse.  · Your symptoms do not get better within 10 days.  Get help right away if:  · You have a very bad headache.  · You cannot stop throwing up (vomiting).  · You have very bad pain or swelling around your face or eyes.  · You have trouble seeing.  · You feel confused.  · Your neck is stiff.  · You have trouble breathing.  Summary  · Sinusitis is swelling of your sinuses. Sinuses are hollow spaces in the bones around your face.  · This condition is caused by tissues in your nose that become inflamed or swollen. This traps germs. These can lead to infection.  · If you were prescribed an antibiotic medicine, take it as told by your doctor. Do not stop taking it even if you start to feel better.  · Keep all follow-up visits as told by your doctor. This is important.  This information is not intended to replace advice given to you by your health care provider. Make sure you discuss any questions you have with your health care provider.  Document Released: 06/05/2009 Document Revised: 05/20/2019 Document Reviewed: 05/20/2019  [employer] Interactive Patient Education © 2019 [employer]    How to Use a Metered Dose Inhaler  A metered dose inhaler is a handheld device for taking medicine that must be breathed into the lungs (inhaled). The device can be used to deliver a variety of inhaled medicines, including:  · Quick relief or rescue medicines, such as bronchodilators.  · Controller medicines, such as corticosteroids.  The medicine is delivered by pushing down on a metal canister to release a preset amount of spray and medicine. Each device contains the amount of medicine that is needed for a preset number of uses (inhalations).  Your health care provider may recommend that you use a spacer with your inhaler to help you take the medicine more effectively. A spacer is a plastic tube with a mouthpiece on one end and an opening that connects to the inhaler on the other end. A spacer  holds the medicine in a tube for a short time, which allows you to inhale more medicine.  What are the risks?  If you do not use your inhaler correctly, medicine might not reach your lungs to help you breathe.  Inhaler medicine can cause side effects, such as:  · Mouth or throat infection.  · Cough.  · Hoarseness.  · Headache.  · Nausea and vomiting.  · Lung infection (pneumonia) in people who have a lung condition called COPD.  How to use a metered dose inhaler without a spacer    1. Remove the cap from the inhaler.  2. If you are using the inhaler for the first time, shake it for 5 seconds, turn it away from your face, then release 4 puffs into the air. This is called priming.  3. Shake the inhaler for 5 seconds.  4. Position the inhaler so the top of the canister faces up.  5. Put your index finger on the top of the medicine canister. Support the bottom of the inhaler with your thumb.  6. Breathe out normally and as completely as possible, away from the inhaler.  7. Either place the inhaler between your teeth and close your lips tightly around the mouthpiece, or hold the inhaler 1-2 inches (2.5-5 cm) away from your open mouth. Keep your tongue down out of the way. If you are unsure which technique to use, ask your health care provider.  8. Press the canister down with your index finger to release the medicine, then inhale deeply and slowly through your mouth (not your nose) until your lungs are completely filled. Inhaling should take 4-6 seconds.  9. Hold the medicine in your lungs for 5-10 seconds (10 seconds is best). This helps the medicine get into the small airways of your lungs.  10. With your lips in a tight [place] (pursed), breathe out slowly.  11. Repeat steps 3-10 until you have taken the number of puffs that your health care provider directed. Wait about 1 minute between puffs or as directed.  12. Put the cap on the inhaler.  13. If you are using a steroid inhaler, rinse your mouth with water, gargle,  and spit out the water. Do not swallow the water.  How to use a metered dose inhaler with a spacer    1. Remove the cap from the inhaler.  2. If you are using the inhaler for the first time, shake it for 5 seconds, turn it away from your face, then release 4 puffs into the air. This is called priming.  3. Shake the inhaler for 5 seconds.  4. Place the open end of the spacer onto the inhaler mouthpiece.  5. Position the inhaler so the top of the canister faces up and the spacer mouthpiece faces you.  6. Put your index finger on the top of the medicine canister. Support the bottom of the inhaler and the spacer with your thumb.  7. Breathe out normally and as completely as possible, away from the spacer.  8. Place the spacer between your teeth and close your lips tightly around it. Keep your tongue down out of the way.  9. Press the canister down with your index finger to release the medicine, then inhale deeply and slowly through your mouth (not your nose) until your lungs are completely filled. Inhaling should take 4-6 seconds.  10. Hold the medicine in your lungs for 5-10 seconds (10 seconds is best). This helps the medicine get into the small airways of your lungs.  11. With your lips in a tight [place] (pursed), breathe out slowly.  12. Repeat steps 3-11 until you have taken the number of puffs that your health care provider directed. Wait about 1 minute between puffs or as directed.  13. Remove the spacer from the inhaler and put the cap on the inhaler.  14. If you are using a steroid inhaler, rinse your mouth with water, gargle, and spit out the water. Do not swallow the water.  Follow these instructions at home:  · Take your inhaled medicine only as told by your health care provider. Do not use the inhaler more than directed by your health care provider.  · Keep all follow-up visits as told by your health care provider. This is important.  · If your inhaler has a counter, you can check it to determine how full  your inhaler is. If your inhaler does not have a counter, ask your health care provider when you will need to refill your inhaler and write the refill date on a calendar or on your inhaler canister. Note that you cannot know when an inhaler is empty by shaking it.  · Follow directions on the package insert for care and cleaning of your inhaler and spacer.  Contact a health care provider if:  · Symptoms are only partially relieved with your inhaler.  · You are having trouble using your inhaler.  · You have an increase in phlegm.  · You have headaches.  Get help right away if:  · You feel little or no relief after using your inhaler.  · You have dizziness.  · You have a fast heart rate.  · You have chills or a fever.  · You have night sweats.  · There is blood in your phlegm.  Summary  · A metered dose inhaler is a handheld device for taking medicine that must be breathed into the lungs (inhaled).  · The medicine is delivered by pushing down on a metal canister to release a preset amount of spray and medicine.  · Each device contains the amount of medicine that is needed for a preset number of uses (inhalations).  This information is not intended to replace advice given to you by your health care provider. Make sure you discuss any questions you have with your health care provider.  Document Released: 12/18/2006 Document Revised: 07/09/2018 Document Reviewed: 11/07/2017  [employer] Interactive Patient Education © 2019 [employer]    Acute Bronchitis, Adult  Acute bronchitis is when air tubes (bronchi) in the lungs suddenly get swollen. The condition can make it hard to breathe. It can also cause these symptoms:  · A cough.  · Coughing up clear, yellow, or green mucus.  · Wheezing.  · Chest congestion.  · Shortness of breath.  · A fever.  · Body aches.  · Chills.  · A sore throat.  Follow these instructions at home:    Medicines  · Take over-the-counter and prescription medicines only as told by your doctor.  · If you  were prescribed an antibiotic medicine, take it as told by your doctor. Do not stop taking the antibiotic even if you start to feel better.  General instructions  · Rest.  · Drink enough fluids to keep your pee (urine) pale yellow.  · Avoid smoking and secondhand smoke. If you smoke and you need help quitting, ask your doctor. Quitting will help your lungs heal faster.  · Use an inhaler, cool mist vaporizer, or humidifier as told by your doctor.  · Keep all follow-up visits as told by your doctor. This is important.  How is this prevented?  To lower your risk of getting this condition again:  · Wash your hands often with soap and water. If you cannot use soap and water, use hand .  · Avoid contact with people who have cold symptoms.  · Try not to touch your hands to your mouth, nose, or eyes.  · Make sure to get the flu shot every year.  Contact a doctor if:  · Your symptoms do not get better in 2 weeks.  Get help right away if:  · You cough up blood.  · You have chest pain.  · You have very bad shortness of breath.  · You become dehydrated.  · You faint (pass out) or keep feeling like you are going to pass out.  · You keep throwing up (vomiting).  · You have a very bad headache.  · Your fever or chills gets worse.  This information is not intended to replace advice given to you by your health care provider. Make sure you discuss any questions you have with your health care provider.  Document Released: 06/05/2009 Document Revised: 08/01/2018 Document Reviewed: 06/07/2017  [employer] Interactive Patient Education © 2019 [employer]     none

## 2023-04-04 ENCOUNTER — OFFICE VISIT (OUTPATIENT)
Dept: FAMILY MEDICINE CLINIC | Facility: CLINIC | Age: 68
End: 2023-04-04

## 2023-04-04 VITALS
OXYGEN SATURATION: 92 % | TEMPERATURE: 97.9 F | HEIGHT: 71 IN | HEART RATE: 94 BPM | WEIGHT: 265.6 LBS | BODY MASS INDEX: 37.18 KG/M2 | SYSTOLIC BLOOD PRESSURE: 120 MMHG | DIASTOLIC BLOOD PRESSURE: 80 MMHG

## 2023-04-04 DIAGNOSIS — I25.10 CORONARY ARTERY DISEASE INVOLVING NATIVE CORONARY ARTERY OF NATIVE HEART WITHOUT ANGINA PECTORIS: ICD-10-CM

## 2023-04-04 DIAGNOSIS — I10 PRIMARY HYPERTENSION: Primary | ICD-10-CM

## 2023-04-04 DIAGNOSIS — Z12.5 SCREENING FOR PROSTATE CANCER: ICD-10-CM

## 2023-04-04 DIAGNOSIS — E78.5 HYPERLIPIDEMIA, UNSPECIFIED HYPERLIPIDEMIA TYPE: ICD-10-CM

## 2023-04-04 DIAGNOSIS — E66.01 OBESITY, MORBID (HCC): ICD-10-CM

## 2023-04-04 DIAGNOSIS — Z85.828 HX OF SKIN CANCER, BASAL CELL: ICD-10-CM

## 2023-04-04 DIAGNOSIS — F17.200 SMOKER: ICD-10-CM

## 2023-04-04 RX ORDER — ATORVASTATIN CALCIUM 80 MG/1
80 TABLET, FILM COATED ORAL DAILY
COMMUNITY
End: 2023-04-04 | Stop reason: SDUPTHER

## 2023-04-04 RX ORDER — METOPROLOL SUCCINATE 50 MG/1
50 TABLET, EXTENDED RELEASE ORAL DAILY
COMMUNITY
End: 2023-04-04 | Stop reason: SDUPTHER

## 2023-04-04 RX ORDER — ATORVASTATIN CALCIUM 80 MG/1
80 TABLET, FILM COATED ORAL DAILY
Qty: 90 TABLET | Refills: 1 | Status: SHIPPED | OUTPATIENT
Start: 2023-04-04

## 2023-04-04 RX ORDER — LISINOPRIL 5 MG/1
5 TABLET ORAL DAILY
COMMUNITY
End: 2023-04-04

## 2023-04-04 RX ORDER — METOPROLOL SUCCINATE 50 MG/1
50 TABLET, EXTENDED RELEASE ORAL DAILY
Qty: 90 TABLET | Refills: 0 | Status: SHIPPED | OUTPATIENT
Start: 2023-04-04

## 2023-04-04 RX ORDER — OLMESARTAN MEDOXOMIL AND HYDROCHLOROTHIAZIDE 20/12.5 20; 12.5 MG/1; MG/1
1 TABLET ORAL DAILY
Qty: 90 TABLET | Refills: 0 | Status: SHIPPED | OUTPATIENT
Start: 2023-04-04

## 2023-04-04 RX ORDER — OLMESARTAN MEDOXOMIL AND HYDROCHLOROTHIAZIDE 20/12.5 20; 12.5 MG/1; MG/1
1 TABLET ORAL DAILY
COMMUNITY
End: 2023-04-04 | Stop reason: SDUPTHER

## 2023-04-04 NOTE — PROGRESS NOTES
Assessment/Plan:     Diagnoses and all orders for this visit:    Primary hypertension  Comments:  Blood pressure is acceptable continue current regimen  Orders:  -     Comprehensive metabolic panel  -     olmesartan-hydrochlorothiazide (BENICAR HCT) 20-12 5 MG per tablet; Take 1 tablet by mouth daily  -     metoprolol succinate (TOPROL-XL) 50 mg 24 hr tablet; Take 1 tablet (50 mg total) by mouth daily    Hyperlipidemia, unspecified hyperlipidemia type  Comments:  Continue statin therapy and low-fat diet  Orders:  -     Lipid panel  -     atorvastatin (LIPITOR) 80 mg tablet; Take 1 tablet (80 mg total) by mouth daily    Coronary artery disease involving native coronary artery of native heart without angina pectoris  Comments:  No angina  No signs of congestive heart failure  Orders:  -     CBC and differential  -     metoprolol succinate (TOPROL-XL) 50 mg 24 hr tablet; Take 1 tablet (50 mg total) by mouth daily    Smoker  Comments:  Quit smoking    Hx of skin cancer, basal cell  -     CBC and differential    Screening for prostate cancer  -     PSA, Total Screen    Obesity, morbid (HCC)  Comments:  EXTR size and weight loss encouraged    Other orders  -     Discontinue: olmesartan-hydrochlorothiazide (BENICAR HCT) 20-12 5 MG per tablet; Take 1 tablet by mouth daily  -     Discontinue: metoprolol succinate (TOPROL-XL) 50 mg 24 hr tablet; Take 50 mg by mouth daily  -     Discontinue: lisinopril (ZESTRIL) 5 mg tablet; Take 5 mg by mouth daily  -     Discontinue: atorvastatin (LIPITOR) 80 mg tablet; Take 80 mg by mouth daily  -     Aspirin 81 MG CAPS; Take 81 mg by mouth daily          Subjective:      Patient ID: Elian Millard is a 75 y o  male  Patient is new to the office  He is here with his wife and his daughter to establish care  Generally from [place] and immigrated to [place] [id number]  Daughter explains that they have a [address]  Is admitted 2019 for chest pain  He had angioplasty    He has been seeing his family doctor and cardiologist   He is maintained on metoprolol Benicar-HCTZ and atorvastatin  He also takes a baby aspirin  And is overweight  Patient will alcohol  He is still an active smoker  Daughter states he had labs 3 months ago and everything was good she did give me a flash drive to review his past medical records  Repeat labs in 3 months and have a follow-up appointment      The following portions of the patient's history were reviewed and updated as appropriate:   He   Patient Active Problem List    Diagnosis Date Noted   • Coronary artery disease involving native coronary artery of native heart without angina pectoris 04/04/2023   • Hyperlipidemia 04/04/2023   • Primary hypertension 04/04/2023   • Hx of skin cancer, basal cell 04/04/2023   • Obesity, morbid ([address] ) 04/04/2023     Current Outpatient Medications   Medication Sig Dispense Refill   • Aspirin 81 MG CAPS Take 81 mg by mouth daily     • atorvastatin (LIPITOR) 80 mg tablet Take 1 tablet (80 mg total) by mouth daily 90 tablet 1   • metoprolol succinate (TOPROL-XL) 50 mg 24 hr tablet Take 1 tablet (50 mg total) by mouth daily 90 tablet 0   • olmesartan-hydrochlorothiazide (BENICAR HCT) 20-12 5 MG per tablet Take 1 tablet by mouth daily 90 tablet 0     No current facility-administered medications for this visit  He has No Known Allergies       Review of Systems   Constitutional: Negative for activity change, appetite change, chills, fatigue and fever  HENT: Negative for ear pain and sore throat  Eyes: Negative for visual disturbance  Respiratory: Negative for cough and shortness of breath  Cardiovascular: Positive for leg swelling  Negative for chest pain and palpitations  Gastrointestinal: Negative for abdominal pain, blood in stool, constipation, diarrhea and nausea  Genitourinary: Negative for difficulty urinating  Musculoskeletal: Negative for arthralgias, back pain and myalgias          Ankle  pain Skin: Negative for rash  Neurological: Positive for dizziness and headaches  Negative for syncope  Occasional lightheadedness after bending over and standing up  Psychiatric/Behavioral: Negative for sleep disturbance  Objective:        Physical Exam  Vitals and nursing note reviewed  Constitutional:       General: He is not in acute distress  Appearance: He is well-developed  He is obese  He is not ill-appearing  HENT:      Head: Normocephalic and atraumatic  Right Ear: External ear normal  There is impacted cerumen  Left Ear: External ear normal  There is impacted cerumen  Eyes:      Conjunctiva/sclera: Conjunctivae normal       Pupils: Pupils are equal, round, and reactive to light  Neck:      Thyroid: No thyromegaly  Vascular: No carotid bruit  Cardiovascular:      Rate and Rhythm: Normal rate and regular rhythm  Heart sounds: Normal heart sounds  No murmur heard  Pulmonary:      Effort: Pulmonary effort is normal       Breath sounds: Normal breath sounds  No wheezing  Abdominal:      General: Abdomen is protuberant  Bowel sounds are normal       Palpations: Abdomen is soft  There is no mass  Tenderness: There is no abdominal tenderness  Musculoskeletal:      Right lower leg: Edema present  Left lower leg: Edema present  Comments: Patient has no lower leg pitting edema  He does have some mild swelling around the ankles left greater than right  Complaining of left ankle pain for 2 years  No injury  He has some tenderness in the medial malleoli are area that he has some arthritis  Lymphadenopathy:      Cervical: No cervical adenopathy  Skin:     General: Skin is warm and dry  Neurological:      General: No focal deficit present  Mental Status: He is alert and oriented to person, place, and time  Psychiatric:         Mood and Affect: Mood normal          Behavior: Behavior normal          Thought Content:  Thought content normal          Judgment: Judgment normal      BMI Counseling: Body mass index is 37 04 kg/m²  The BMI is above normal  Nutrition recommendations include decreasing portion sizes, encouraging healthy choices of fruits and vegetables and moderation in carbohydrate intake  Exercise recommendations include exercising 3-5 times per week  No pharmacotherapy was ordered  Rationale for BMI follow-up plan is due to patient being overweight or obese  Depression Screening and Follow-up Plan: Patient was screened for depression during today's encounter  They screened negative with a PHQ-2 score of 2

## 2023-06-19 NOTE — ASU PREOP CHECKLIST - BP NONINVASIVE SYSTOLIC (MM HG)
Other PATIENT HAS A QUESTION ABOUT HER BRACE. SAYS IT IS LOOSENING WHEN SHE WALKS WANTS TO KNOW IF SHE HAS IT ON THE CORRECT WAY.  [address] [phone]

## 2023-07-02 DIAGNOSIS — I25.10 CORONARY ARTERY DISEASE INVOLVING NATIVE CORONARY ARTERY OF NATIVE HEART WITHOUT ANGINA PECTORIS: ICD-10-CM

## 2023-07-02 DIAGNOSIS — I10 PRIMARY HYPERTENSION: ICD-10-CM

## 2023-07-05 RX ORDER — METOPROLOL SUCCINATE 50 MG/1
TABLET, EXTENDED RELEASE ORAL
Qty: 30 TABLET | Refills: 0 | Status: SHIPPED | OUTPATIENT
Start: 2023-07-05

## 2023-07-05 RX ORDER — OLMESARTAN MEDOXOMIL AND HYDROCHLOROTHIAZIDE 20/12.5 20; 12.5 MG/1; MG/1
TABLET ORAL
Qty: 30 TABLET | Refills: 0 | Status: SHIPPED | OUTPATIENT
Start: 2023-07-05

## 2023-07-27 DIAGNOSIS — I10 PRIMARY HYPERTENSION: ICD-10-CM

## 2023-07-27 DIAGNOSIS — I25.10 CORONARY ARTERY DISEASE INVOLVING NATIVE CORONARY ARTERY OF NATIVE HEART WITHOUT ANGINA PECTORIS: ICD-10-CM

## 2023-07-28 RX ORDER — OLMESARTAN MEDOXOMIL AND HYDROCHLOROTHIAZIDE 20/12.5 20; 12.5 MG/1; MG/1
TABLET ORAL
Qty: 30 TABLET | Refills: 0 | Status: SHIPPED | OUTPATIENT
Start: 2023-07-28 | End: 2023-09-22

## 2023-07-28 RX ORDER — METOPROLOL SUCCINATE 50 MG/1
TABLET, EXTENDED RELEASE ORAL
Qty: 30 TABLET | Refills: 0 | Status: SHIPPED | OUTPATIENT
Start: 2023-07-28

## 2023-08-18 DIAGNOSIS — I10 PRIMARY HYPERTENSION: ICD-10-CM

## 2023-08-18 RX ORDER — OLMESARTAN MEDOXOMIL AND HYDROCHLOROTHIAZIDE 20/12.5 20; 12.5 MG/1; MG/1
TABLET ORAL
Qty: 30 TABLET | Refills: 0 | OUTPATIENT
Start: 2023-08-18

## 2023-08-20 DIAGNOSIS — I25.10 CORONARY ARTERY DISEASE INVOLVING NATIVE CORONARY ARTERY OF NATIVE HEART WITHOUT ANGINA PECTORIS: ICD-10-CM

## 2023-08-20 DIAGNOSIS — I10 PRIMARY HYPERTENSION: ICD-10-CM

## 2023-08-21 RX ORDER — METOPROLOL SUCCINATE 50 MG/1
TABLET, EXTENDED RELEASE ORAL
Qty: 90 TABLET | Refills: 1 | OUTPATIENT
Start: 2023-08-21

## 2023-09-22 DIAGNOSIS — I10 PRIMARY HYPERTENSION: ICD-10-CM

## 2023-09-22 RX ORDER — OLMESARTAN MEDOXOMIL AND HYDROCHLOROTHIAZIDE 20/12.5 20; 12.5 MG/1; MG/1
TABLET ORAL
Qty: 30 TABLET | Refills: 0 | Status: SHIPPED | OUTPATIENT
Start: 2023-09-22

## 2023-11-16 DIAGNOSIS — E78.5 HYPERLIPIDEMIA, UNSPECIFIED HYPERLIPIDEMIA TYPE: ICD-10-CM

## 2023-11-16 RX ORDER — ATORVASTATIN CALCIUM 80 MG/1
80 TABLET, FILM COATED ORAL DAILY
Qty: 90 TABLET | Refills: 0 | Status: SHIPPED | OUTPATIENT
Start: 2023-11-16 | End: 2023-11-21 | Stop reason: SDUPTHER

## 2023-11-21 ENCOUNTER — OFFICE VISIT (OUTPATIENT)
Dept: FAMILY MEDICINE CLINIC | Facility: CLINIC | Age: 68
End: 2023-11-21
Payer: COMMERCIAL

## 2023-11-21 ENCOUNTER — APPOINTMENT (OUTPATIENT)
Dept: LAB | Facility: MEDICAL CENTER | Age: 68
End: 2023-11-21
Payer: COMMERCIAL

## 2023-11-21 VITALS
DIASTOLIC BLOOD PRESSURE: 76 MMHG | HEART RATE: 76 BPM | SYSTOLIC BLOOD PRESSURE: 120 MMHG | HEIGHT: 71 IN | OXYGEN SATURATION: 96 % | TEMPERATURE: 97.3 F | WEIGHT: 278.6 LBS | BODY MASS INDEX: 39 KG/M2

## 2023-11-21 DIAGNOSIS — R07.9 CHEST PAIN, UNSPECIFIED TYPE: ICD-10-CM

## 2023-11-21 DIAGNOSIS — I25.10 CORONARY ARTERY DISEASE INVOLVING NATIVE CORONARY ARTERY OF NATIVE HEART WITHOUT ANGINA PECTORIS: ICD-10-CM

## 2023-11-21 DIAGNOSIS — E78.5 HYPERLIPIDEMIA, UNSPECIFIED HYPERLIPIDEMIA TYPE: ICD-10-CM

## 2023-11-21 DIAGNOSIS — Z12.2 SCREENING FOR LUNG CANCER: ICD-10-CM

## 2023-11-21 DIAGNOSIS — D17.0 LIPOMA OF FACE: ICD-10-CM

## 2023-11-21 DIAGNOSIS — F17.200 SMOKER: ICD-10-CM

## 2023-11-21 DIAGNOSIS — Z00.00 MEDICARE ANNUAL WELLNESS VISIT, SUBSEQUENT: Primary | ICD-10-CM

## 2023-11-21 DIAGNOSIS — I10 PRIMARY HYPERTENSION: ICD-10-CM

## 2023-11-21 DIAGNOSIS — R06.02 SOB (SHORTNESS OF BREATH) ON EXERTION: ICD-10-CM

## 2023-11-21 DIAGNOSIS — Z13.6 SCREENING FOR AAA (ABDOMINAL AORTIC ANEURYSM): ICD-10-CM

## 2023-11-21 DIAGNOSIS — E66.01 OBESITY, MORBID (HCC): ICD-10-CM

## 2023-11-21 DIAGNOSIS — Z12.11 SCREEN FOR COLON CANCER: ICD-10-CM

## 2023-11-21 PROBLEM — J44.9 COPD (CHRONIC OBSTRUCTIVE PULMONARY DISEASE) (HCC): Status: ACTIVE | Noted: 2023-11-21

## 2023-11-21 LAB
ALBUMIN SERPL BCP-MCNC: 4.2 G/DL (ref 3.5–5)
ALP SERPL-CCNC: 83 U/L (ref 34–104)
ALT SERPL W P-5'-P-CCNC: 21 U/L (ref 7–52)
ANION GAP SERPL CALCULATED.3IONS-SCNC: 8 MMOL/L
AST SERPL W P-5'-P-CCNC: 21 U/L (ref 13–39)
BASOPHILS # BLD AUTO: 0.05 THOUSANDS/ÂΜL (ref 0–0.1)
BASOPHILS NFR BLD AUTO: 1 % (ref 0–1)
BILIRUB SERPL-MCNC: 0.85 MG/DL (ref 0.2–1)
BUN SERPL-MCNC: 9 MG/DL (ref 5–25)
CALCIUM SERPL-MCNC: 9.8 MG/DL (ref 8.4–10.2)
CHLORIDE SERPL-SCNC: 101 MMOL/L (ref 96–108)
CHOLEST SERPL-MCNC: 115 MG/DL
CO2 SERPL-SCNC: 30 MMOL/L (ref 21–32)
CREAT SERPL-MCNC: 0.81 MG/DL (ref 0.6–1.3)
EOSINOPHIL # BLD AUTO: 0.15 THOUSAND/ÂΜL (ref 0–0.61)
EOSINOPHIL NFR BLD AUTO: 2 % (ref 0–6)
ERYTHROCYTE [DISTWIDTH] IN BLOOD BY AUTOMATED COUNT: 13.4 % (ref 11.6–15.1)
GFR SERPL CREATININE-BSD FRML MDRD: 91 ML/MIN/1.73SQ M
GLUCOSE P FAST SERPL-MCNC: 99 MG/DL (ref 65–99)
HCT VFR BLD AUTO: 49.1 % (ref 36.5–49.3)
HDLC SERPL-MCNC: 45 MG/DL
HGB BLD-MCNC: 15.5 G/DL (ref 12–17)
IMM GRANULOCYTES # BLD AUTO: 0.02 THOUSAND/UL (ref 0–0.2)
IMM GRANULOCYTES NFR BLD AUTO: 0 % (ref 0–2)
LDLC SERPL CALC-MCNC: 45 MG/DL (ref 0–100)
LYMPHOCYTES # BLD AUTO: 2.5 THOUSANDS/ÂΜL (ref 0.6–4.47)
LYMPHOCYTES NFR BLD AUTO: 31 % (ref 14–44)
MCH RBC QN AUTO: 31.6 PG (ref 26.8–34.3)
MCHC RBC AUTO-ENTMCNC: 31.6 G/DL (ref 31.4–37.4)
MCV RBC AUTO: 100 FL (ref 82–98)
MONOCYTES # BLD AUTO: 0.65 THOUSAND/ÂΜL (ref 0.17–1.22)
MONOCYTES NFR BLD AUTO: 8 % (ref 4–12)
NEUTROPHILS # BLD AUTO: 4.69 THOUSANDS/ÂΜL (ref 1.85–7.62)
NEUTS SEG NFR BLD AUTO: 58 % (ref 43–75)
NONHDLC SERPL-MCNC: 70 MG/DL
NRBC BLD AUTO-RTO: 0 /100 WBCS
PLATELET # BLD AUTO: 165 THOUSANDS/UL (ref 149–390)
PMV BLD AUTO: 12.3 FL (ref 8.9–12.7)
POTASSIUM SERPL-SCNC: 4.4 MMOL/L (ref 3.5–5.3)
PROT SERPL-MCNC: 7.4 G/DL (ref 6.4–8.4)
PSA SERPL-MCNC: 0.53 NG/ML (ref 0–4)
RBC # BLD AUTO: 4.91 MILLION/UL (ref 3.88–5.62)
SODIUM SERPL-SCNC: 139 MMOL/L (ref 135–147)
TRIGL SERPL-MCNC: 127 MG/DL
WBC # BLD AUTO: 8.06 THOUSAND/UL (ref 4.31–10.16)

## 2023-11-21 PROCEDURE — G0439 PPPS, SUBSEQ VISIT: HCPCS | Performed by: PHYSICIAN ASSISTANT

## 2023-11-21 PROCEDURE — 99214 OFFICE O/P EST MOD 30 MIN: CPT | Performed by: PHYSICIAN ASSISTANT

## 2023-11-21 RX ORDER — ATORVASTATIN CALCIUM 80 MG/1
80 TABLET, FILM COATED ORAL DAILY
Qty: 90 TABLET | Refills: 0 | Status: SHIPPED | OUTPATIENT
Start: 2023-11-21

## 2023-11-21 RX ORDER — OLMESARTAN MEDOXOMIL AND HYDROCHLOROTHIAZIDE 20/12.5 20; 12.5 MG/1; MG/1
1 TABLET ORAL DAILY
Qty: 90 TABLET | Refills: 1 | Status: SHIPPED | OUTPATIENT
Start: 2023-11-21

## 2023-11-21 RX ORDER — METOPROLOL SUCCINATE 50 MG/1
50 TABLET, EXTENDED RELEASE ORAL DAILY
Qty: 90 TABLET | Refills: 1 | Status: SHIPPED | OUTPATIENT
Start: 2023-11-21

## 2023-11-21 NOTE — PROGRESS NOTES
Assessment and Plan:     Problem List Items Addressed This Visit          Cardiovascular and Mediastinum    Coronary artery disease involving native coronary artery of native heart without angina pectoris    Relevant Orders    Ambulatory Referral to Cardiology    Primary hypertension    Relevant Orders    Ambulatory Referral to Cardiology       Other    Hyperlipidemia    Obesity, morbid ([address])     Other Visit Diagnoses       Medicare annual wellness visit, subsequent    -  Primary    Screen for colon cancer        Relevant Orders    Ambulatory Referral to Gastroenterology    Smoker        Screening for AAA (abdominal aortic aneurysm)        Relevant Orders    US abdominal aorta screening aaa    Screening for lung cancer        Relevant Orders    CT lung screening program    Chest pain, unspecified type        EKG normal sinus rhythm. Proceed with cardiology eval    Relevant Orders    Ambulatory Referral to Cardiology    SOB (shortness of breath) on exertion        CT scan of lungs. May need pulmonary work-up. Lipoma of face        Relevant Orders    Ambulatory Referral to Plastic Surgery             Preventive health issues were discussed with patient, and age appropriate screening tests were ordered as noted in patient's After Visit Summary. Personalized health advice and appropriate referrals for health education or preventive services given if needed, as noted in patient's After Visit Summary. History of Present Illness:     Patient presents for a Medicare Wellness Visit    Presents in the office with his wife and daughter-in-law for follow-up chronic conditions. Patient has known coronary artery disease and hypertension. Blood pressure is controlled he is on Benicar HCTZ 20-12 0.5 and metoprolol ER 25 mg. He also takes a baby aspirin. Hyperlipidemia he is on atorvastatin 80 mg. Patient has been having some shortness of breath with exertion mainly with stairs. She is very obese.   He still smokes and has COPD. Does complain of some intermittent chest pain. EKG in the office and refer to cardiology. Patient also complaining of some headaches and dizziness. His headaches are frontal.  Has not had an eye exam in over 8 years. We will send patient to the eye doctor for exam rule out glaucoma and cataracts. She has increasing skin lesion on the left temple. Is to be most consistent with a lipoma. Will send to plastic surgeon. Patient does have some intermittent lower leg edema. He has nocturia 1-2 times night. On exam today he has no pitting edema. Seems declined. Screening for lung cancer ordered. Ultrasound of the abdominal aorta also ordered. Patient will be seeing gastroenterology for colonoscopy. Patient is very overweight. He is exercising. He does not follow a diet. He continues to smoke. Patient Care Team:  Louis Potts PA-C as PCP - General (Family Medicine)     Review of Systems:     Review of Systems   Constitutional:  Negative for activity change and appetite change. HENT:  Negative for tinnitus. Eyes:  Positive for visual disturbance. Respiratory:  Positive for shortness of breath. Cardiovascular:  Positive for chest pain and leg swelling. Gastrointestinal:  Negative for abdominal pain, diarrhea, nausea and rectal pain. Genitourinary:  Negative for difficulty urinating. Musculoskeletal:  Negative for arthralgias. Skin:         Skin  mass   left   temple   region   Neurological:  Positive for dizziness and headaches. Psychiatric/Behavioral:  Negative for self-injury, sleep disturbance and suicidal ideas. The patient is not nervous/anxious.          Problem List:     Patient Active Problem List   Diagnosis    Coronary artery disease involving native coronary artery of native heart without angina pectoris    Hyperlipidemia    Primary hypertension    Hx of skin cancer, basal cell    Obesity, morbid (HCC)    COPD (chronic obstructive pulmonary disease) ([address] [facility]) Past Medical and Surgical History:     Past Medical History:   Diagnosis Date    Coronary artery disease     Hyperlipidemia     Hypertension      No past surgical history on file. Family History:     Family History   Problem Relation Age of Onset    Hypertension Mother     Stomach cancer Sister       Social History:     Social History     Socioeconomic History    Marital status: /Civil Union     Spouse name: Not on file    Number of children: Not on file    Years of education: Not on file    Highest education level: Not on file   Occupational History    Not on file   Tobacco Use    Smoking status: Every Day     Packs/day: 1.00     Years: 50.00     Total pack years: 50.00     Types: Cigarettes    Smokeless tobacco: Never   Vaping Use    Vaping Use: Never used   Substance and Sexual Activity    Alcohol use: Yes    Drug use: Never    Sexual activity: Not on file   Other Topics Concern    Not on file   Social History Narrative    Not on file     Social Determinants of Health     Financial Resource Strain: Low Risk  (11/21/2023)    Overall Financial Resource Strain (CARDIA)     Difficulty of Paying Living Expenses: Not hard at all   Food Insecurity: Not on file   Transportation Needs: No Transportation Needs (11/21/2023)    PRAPARE - Transportation     Lack of Transportation (Medical): No     Lack of Transportation (Non-Medical):  No   Physical Activity: Not on file   Stress: Not on file   Social Connections: Not on file   Intimate Partner Violence: Not on file   Housing Stability: Not on file      Medications and Allergies:     Current Outpatient Medications   Medication Sig Dispense Refill    Aspirin 81 MG CAPS Take 81 mg by mouth daily      atorvastatin (LIPITOR) 80 mg tablet TAKE 1 TABLET BY MOUTH EVERY DAY 90 tablet 0    metoprolol succinate (TOPROL-XL) 50 mg 24 hr tablet TAKE 1 TABLET BY MOUTH EVERY DAY 30 tablet 0    olmesartan-hydrochlorothiazide (BENICAR HCT) 20-12.5 MG per tablet TAKE 1 TABLET BY MOUTH EVERY DAY. .. PER MD YOU ARE DUE FOR LAB WORK 30 tablet 0     No current facility-administered medications for this visit. No Known Allergies   Immunizations: There is no immunization history on file for this patient. Health Maintenance:         Topic Date Due    Hepatitis C Screening  Never done    Colorectal Cancer Screening  Never done    Lung Cancer Screening  05/22/2020         Topic Date Due    COVID-19 Vaccine (1) Never done    Pneumococcal Vaccine: 65+ Years (1 - PCV) Never done    Influenza Vaccine (1) Never done      Medicare Screening Tests and Risk Assessments:     Idris Amin is here for his Subsequent Wellness visit. Health Risk Assessment:   Patient rates overall health as fair. Patient feels that their physical health rating is slightly worse. Patient is satisfied with their life. Eyesight was rated as slightly worse. Hearing was rated as same. Patient feels that their emotional and mental health rating is same. Patients states they are sometimes angry. Patient states they are sometimes unusually tired/fatigued. Pain experienced in the last 7 days has been none. Patient states that he has experienced no weight loss or gain in last 6 months. Fall Risk Screening: In the past year, patient has experienced: history of falling in past year      Home Safety:  Patient does not have trouble with stairs inside or outside of their home. Patient has working smoke alarms and has working carbon monoxide detector. Home safety hazards include: none. Nutrition:   Current diet is Regular. Medications:   Patient is not currently taking any over-the-counter supplements. Patient is able to manage medications. Activities of Daily Living (ADLs)/Instrumental Activities of Daily Living (IADLs):   Walk and transfer into and out of bed and chair?: Yes  Dress and groom yourself?: Yes    Bathe or shower yourself?: Yes    Feed yourself?  Yes  Do your laundry/housekeeping?: Yes  Manage your money, pay your bills and track your expenses?: Yes  Make your own meals?: Yes    Do your own shopping?: Yes    Previous Hospitalizations:   Any hospitalizations or ED visits within the last 12 months?: No      Advance Care Planning:   Living will: No    Durable POA for healthcare: No    Advanced directive: No    ACP document given: Yes      Cognitive Screening:   Provider or family/friend/caregiver concerned regarding cognition?: No    PREVENTIVE SCREENINGS      Cardiovascular Screening:    General: History Lipid Disorder    Due for: Lipid Panel      Diabetes Screening:       Due for: Blood Glucose      Colorectal Cancer Screening:       Due for: Colonoscopy - Low Risk      Abdominal Aortic Aneurysm (AAA) Screening:    Risk factors include: age between 70-75 yo and tobacco use      Due for: Screening AAA Ultrasound      Lung Cancer Screening:       Due for: Low Dose CT (LDCT)    Screening, Brief Intervention, and Referral to Treatment (SBIRT)    Screening  Typical number of drinks in a day: 1  Typical number of drinks in a week: 8  Interpretation: Low risk drinking behavior. AUDIT-C Screenin) How often did you have a drink containing alcohol in the past year? never  2) How many drinks did you have on a typical day when you were drinking in the past year? 0  3) How often did you have 6 or more drinks on one occasion in the past year? never    AUDIT-C Score: 0  Interpretation: Score 0-3 (male): Negative screen for alcohol misuse    Single Item Drug Screening:  How often have you used an illegal drug (including marijuana) or a prescription medication for non-medical reasons in the past year? never    Single Item Drug Screen Score: 0  Interpretation: Negative screen for possible drug use disorder  I discussed with him that he is a candidate for lung cancer CT screening.      The following Shared Decision-Making points were covered:  Benefits of screening were discussed, including the rates of reduction in death from lung cancer and other causes. Harms of screening were reviewed, including false positive tests, radiation exposure levels, risks of invasive procedures, risks of complications of screening, and risk of overdiagnosis. I counseled on the importance of adherence to annual lung cancer LDCT screening, impact of co-morbidities, and ability or willingness to undergo diagnosis and treatment. I counseled on the importance of maintaining abstinence as a former smoker or was counseled on the importance of smoking cessation if a current smoker    Review of Eligibility Criteria: He meets all of the criteria for Lung Cancer Screening. He is 75 y.o. He has 20 pack year tobacco history and is a current smoker or has quit within the past 15 years  He presents no signs or symptoms of lung cancer    After discussion, the patient decided to elect lung cancer screening. No results found. Physical Exam:     /76 (BP Location: Left arm, Patient Position: Sitting, Cuff Size: Large)   Pulse 76   Temp (!) 97.3 °F (36.3 °C) (Temporal)   Ht 5' 11" (1.803 m)   Wt 126 kg (278 lb 9.6 oz)   SpO2 96%   BMI 38.86 kg/m²     Physical Exam  Vitals and nursing note reviewed. Constitutional:       General: He is not in acute distress. Appearance: He is obese. He is not ill-appearing. HENT:      Head: Normocephalic and atraumatic. Right Ear: External ear normal. There is impacted cerumen. Left Ear: External ear normal. There is impacted cerumen. Eyes:      Conjunctiva/sclera: Conjunctivae normal.      Pupils: Pupils are equal, round, and reactive to light. Neck:      Thyroid: No thyromegaly. Vascular: No carotid bruit. Cardiovascular:      Rate and Rhythm: Normal rate and regular rhythm. Pulses: Normal pulses. Heart sounds: Normal heart sounds. No murmur heard. Pulmonary:      Effort: Pulmonary effort is normal.      Breath sounds: Normal breath sounds.    Abdominal:      General: Bowel sounds are normal.      Palpations: Abdomen is soft. There is no mass. Tenderness: There is no abdominal tenderness. Comments: Obese  abdomen   Musculoskeletal:      Right lower leg: No edema. Left lower leg: No edema. Lymphadenopathy:      Cervical: No cervical adenopathy. Skin:     General: Skin is warm and dry. Coloration: Skin is not pale. Findings: No erythema. Comments: Lipomatous  lesion  left  temple. Neurological:      General: No focal deficit present. Mental Status: He is alert and oriented to person, place, and time. Psychiatric:         Mood and Affect: Mood normal.         Behavior: Behavior normal.         Thought Content:  Thought content normal.         Judgment: Judgment normal.          Madison Mendoza PA-C

## 2023-11-21 NOTE — PATIENT INSTRUCTIONS
Medicare Preventive Visit Patient Instructions  Thank you for completing your Welcome to Medicare Visit or Medicare Annual Wellness Visit today. Your next wellness visit will be due in one year (11/21/2024). The screening/preventive services that you may require over the next 5-10 years are detailed below. Some tests may not apply to you based off risk factors and/or age. Screening tests ordered at today's visit but not completed yet may show as past due. Also, please note that scanned in results may not display below. Preventive Screenings:  Service Recommendations Previous Testing/Comments   Colorectal Cancer Screening  Colonoscopy    Fecal Occult Blood Test (FOBT)/Fecal Immunochemical Test (FIT)  Fecal DNA/Cologuard Test  Flexible Sigmoidoscopy Age: 43-73 years old   Colonoscopy: every 10 years (May be performed more frequently if at higher risk)  OR  FOBT/FIT: every 1 year  OR  Cologuard: every 3 years  OR  Sigmoidoscopy: every 5 years  Screening may be recommended earlier than age 39 if at higher risk for colorectal cancer. Also, an individualized decision between you and your healthcare provider will decide whether screening between the ages of 77-80 would be appropriate.  Colonoscopy: Not on file  FOBT/FIT: Not on file  Cologuard: Not on file  Sigmoidoscopy: Not on file          Prostate Cancer Screening Individualized decision between patient and health care provider in men between ages of 53-66   Medicare will cover every 12 months beginning on the day after your 50th birthday PSA: No results in last 5 years           Hepatitis C Screening Once for adults born between [address] [place] [address]  More frequently in patients at high risk for Hepatitis C Hep C Antibody: Not on file        Diabetes Screening 1-2 times per year if you're at risk for diabetes or have pre-diabetes Fasting glucose: No results in last 5 years (No results in last 5 years)  A1C: No results in last 5 years (No results in last 5 years) Cholesterol Screening Once every 5 years if you don't have a lipid disorder. May order more often based on risk factors. Lipid panel: Not on file  Screening Not Indicated  History Lipid Disorder      Other Preventive Screenings Covered by Medicare:  Abdominal Aortic Aneurysm (AAA) Screening: covered once if your at risk. You're considered to be at risk if you have a family history of AAA or a male between the age of 70-76 who smoking at least 100 cigarettes in your lifetime. Lung Cancer Screening: covers low dose CT scan once per year if you meet all of the following conditions: (1) Age 48-67; (2) No signs or symptoms of lung cancer; (3) Current smoker or have quit smoking within the last 15 years; (4) You have a tobacco smoking history of at least 20 pack years (packs per day x number of years you smoked); (5) You get a written order from a healthcare provider. Glaucoma Screening: covered annually if you're considered high risk: (1) You have diabetes OR (2) Family history of glaucoma OR (3)  aged 48 and older OR (3)  American aged 72 and older  Osteoporosis Screening: covered every 2 years if you meet one of the following conditions: (1) Have a vertebral abnormality; (2) On glucocorticoid therapy for more than 3 months; (3) Have primary hyperparathyroidism; (4) On osteoporosis medications and need to assess response to drug therapy. HIV Screening: covered annually if you're between the age of 14-79. Also covered annually if you are younger than 13 and older than 72 with risk factors for HIV infection. For pregnant patients, it is covered up to 3 times per pregnancy.     Immunizations:  Immunization Recommendations   Influenza Vaccine Annual influenza vaccination during flu season is recommended for all persons aged >= 6 months who do not have contraindications   Pneumococcal Vaccine   * Pneumococcal conjugate vaccine = PCV13 (Prevnar 13), PCV15 (Vaxneuvance), PCV20 (Prevnar 20)  * Pneumococcal polysaccharide vaccine = PPSV23 (Pneumovax) Adults 87-62 yo with certain risk factors or if 69+ yo  If never received any pneumonia vaccine: recommend Prevnar 20 (PCV20)  Give PCV20 if previously received 1 dose of PCV13 or PPSV23   Hepatitis B Vaccine 3 dose series if at intermediate or high risk (ex: diabetes, end stage renal disease, liver disease)   Respiratory syncytial virus (RSV) Vaccine - COVERED BY MEDICARE PART D  * RSVPreF3 (Arexvy) CDC recommends that adults 61years of age and older may receive a single dose of RSV vaccine using shared clinical decision-making (SCDM)   Tetanus (Td) Vaccine - COST NOT COVERED BY MEDICARE PART B Following completion of primary series, a booster dose should be given every 10 years to maintain immunity against tetanus. Td may also be given as tetanus wound prophylaxis. Tdap Vaccine - COST NOT COVERED BY MEDICARE PART B Recommended at least once for all adults. For pregnant patients, recommended with each pregnancy. Shingles Vaccine (Shingrix) - COST NOT COVERED BY MEDICARE PART B  2 shot series recommended in those 19 years and older who have or will have weakened immune systems or those 50 years and older     Health Maintenance Due:      Topic Date Due   • Hepatitis C Screening  Never done   • Colorectal Cancer Screening  Never done   • Lung Cancer Screening  05/22/2020     Immunizations Due:      Topic Date Due   • COVID-19 Vaccine (1) Never done   • Pneumococcal Vaccine: 65+ Years (1 - PCV) Never done   • Influenza Vaccine (1) Never done     Advance Directives   What are advance directives? Advance directives are legal documents that state your wishes and plans for medical care. These plans are made ahead of time in case you lose your ability to make decisions for yourself. Advance directives can apply to any medical decision, such as the treatments you want, and if you want to donate organs. What are the types of advance directives?   There are many types of advance directives, and each state has rules about how to use them. You may choose a combination of any of the following:  Living will: This is a written record of the treatment you want. You can also choose which treatments you do not want, which to limit, and which to stop at a certain time. This includes surgery, medicine, IV fluid, and tube feedings. Durable power of  for healthcare [facility]): This is a written record that states who you want to make healthcare choices for you when you are unable to make them for yourself. This person, called a proxy, is usually a family member or a friend. You may choose more than 1 proxy. Do not resuscitate (DNR) order:  A DNR order is used in case your heart stops beating or you stop breathing. It is a request not to have certain forms of treatment, such as CPR. A DNR order may be included in other types of advance directives. Medical directive: This covers the care that you want if you are in a coma, near death, or unable to make decisions for yourself. You can list the treatments you want for each condition. Treatment may include pain medicine, surgery, blood transfusions, dialysis, IV or tube feedings, and a ventilator (breathing machine). Values history: This document has questions about your views, beliefs, and how you feel and think about life. This information can help others choose the care that you would choose. Why are advance directives important? An advance directive helps you control your care. Although spoken wishes may be used, it is better to have your wishes written down. Spoken wishes can be misunderstood, or not followed. Treatments may be given even if you do not want them. An advance directive may make it easier for your family to make difficult choices about your care. Fall Prevention    Fall prevention  includes ways to make your home and other areas safer. It also includes ways you can move more carefully to prevent a fall. Health conditions that cause changes in your blood pressure, vision, or muscle strength and coordination may increase your risk for falls. Medicines may also increase your risk for falls if they make you dizzy, weak, or sleepy. Fall prevention tips:   Stand or sit up slowly. Use assistive devices as directed. Wear shoes that fit well and have soles that . Wear a personal alarm. Stay active. Manage your medical conditions. Home Safety Tips:  Add items to prevent falls in the bathroom. Keep paths clear. Install bright lights in your home. Keep items you use often on shelves within reach. Paint or place reflective tape on the edges of your stairs. Cigarette Smoking and Your Health   Risks to your health if you smoke:  Nicotine and other chemicals found in tobacco damage every cell in your body. Even if you are a light smoker, you have an increased risk for cancer, heart disease, and lung disease. If you are pregnant or have diabetes, smoking increases your risk for complications. Benefits to your health if you stop smoking: You decrease respiratory symptoms such as coughing, wheezing, and shortness of breath. You reduce your risk for cancers of the lung, mouth, throat, kidney, bladder, pancreas, stomach, and cervix. If you already have cancer, you increase the benefits of chemotherapy. You also reduce your risk for cancer returning or a second cancer from developing. You reduce your risk for heart disease, blood clots, heart attack, and stroke. You reduce your risk for lung infections, and diseases such as pneumonia, asthma, chronic bronchitis, and emphysema. Your circulation improves. More oxygen can be delivered to your body. If you have diabetes, you lower your risk for complications, such as kidney, artery, and eye diseases. You also lower your risk for nerve damage. Nerve damage can lead to amputations, poor vision, and blindness.   You improve your body's ability to heal and to fight infections. For more information and support to stop smoking:   [employer]  Phone: 9- 613 - 130-7705  Web Address: www.[employer]  Weight Management   Why it is important to manage your weight:  Being overweight increases your risk of health conditions such as heart disease, high blood pressure, type 2 diabetes, and certain types of cancer. It can also increase your risk for osteoarthritis, sleep apnea, and other respiratory problems. Aim for a slow, steady weight loss. Even a small amount of weight loss can lower your risk of health problems. How to lose weight safely:  A safe and healthy way to lose weight is to eat fewer calories and get regular exercise. You can lose up about 1 pound a week by decreasing the number of calories you eat by 500 calories each day. Healthy meal plan for weight management:  A healthy meal plan includes a variety of foods, contains fewer calories, and helps you stay healthy. A healthy meal plan includes the following:  Eat whole-grain foods more often. A healthy meal plan should contain fiber. Fiber is the part of grains, fruits, and vegetables that is not broken down by your body. Whole-grain foods are healthy and provide extra fiber in your diet. Some examples of whole-grain foods are whole-wheat breads and pastas, oatmeal, brown rice, and bulgur. Eat a variety of vegetables every day. Include dark, leafy greens such as spinach, kale, dolly greens, and mustard greens. Eat yellow and orange vegetables such as carrots, sweet potatoes, and winter squash. Eat a variety of fruits every day. Choose fresh or canned fruit (canned in its own juice or light syrup) instead of juice. Fruit juice has very little or no fiber. Eat low-fat dairy foods. Drink fat-free (skim) milk or 1% milk. Eat fat-free yogurt and low-fat cottage cheese. Try low-fat cheeses such as mozzarella and other reduced-fat cheeses.   Choose meat and other protein foods that are low in fat.  Choose beans or other legumes such as split peas or lentils. Choose fish, skinless poultry (chicken or turkey), or lean cuts of red meat (beef or pork). Before you cook meat or poultry, cut off any visible fat. Use less fat and oil. Try baking foods instead of frying them. Add less fat, such as margarine, sour cream, regular salad dressing and mayonnaise to foods. Eat fewer high-fat foods. Some examples of high-fat foods include french fries, doughnuts, ice cream, and cakes. Eat fewer sweets. Limit foods and drinks that are high in sugar. This includes candy, cookies, regular soda, and sweetened drinks. Exercise:  Exercise at least 30 minutes per day on most days of the week. Some examples of exercise include walking, biking, dancing, and swimming. You can also fit in more physical activity by taking the stairs instead of the elevator or parking farther away from stores. Ask your healthcare provider about the best exercise plan for you. © Copyright [employer] 2018 Information is for End User's use only and may not be sold, redistributed or otherwise used for commercial purposes.  All illustrations and images included in CareNotes® are the copyrighted property of [employer]., Inc[employer] or  [employer]

## 2023-12-05 ENCOUNTER — TELEPHONE (OUTPATIENT)
Age: 68
End: 2023-12-05

## 2023-12-05 DIAGNOSIS — D17.0 LIPOMA OF FACE: Primary | ICD-10-CM

## 2023-12-05 NOTE — TELEPHONE ENCOUNTER
Katie called does her father need to see a plastic surgeon   she is a little confused with all the referrals    please call her   thank you

## 2023-12-08 ENCOUNTER — PREP FOR PROCEDURE (OUTPATIENT)
Age: 68
End: 2023-12-08

## 2023-12-08 ENCOUNTER — TELEPHONE (OUTPATIENT)
Age: 68
End: 2023-12-08

## 2023-12-08 DIAGNOSIS — Z12.11 SCREENING FOR COLON CANCER: Primary | ICD-10-CM

## 2023-12-08 NOTE — TELEPHONE ENCOUNTER
12/08/23  Screened by: Baron Ellis    Referring Provider     Pre- Screening: answers provided by daughter Brent Duvall ( his )    There is no height or weight on file to calculate BMI. 38.86  Has patient been referred for a routine screening Colonoscopy? yes  Is the patient between 43-73 years old? yes      Previous Colonoscopy yes   If yes:    Date:     Facility:     Reason:       SCHEDULING STAFF: If the patient is between 39yrs-51yrs, please advise patient to confirm benefits/coverage with their insurance company for a routine screening colonoscopy, some insurance carriers will only cover at [address] [place] [address] or older. If the patient is over 66years old, please schedule an office visit. Does the patient want to see a Gastroenterologist prior to their procedure OR are they having any GI symptoms? no    Has the patient been hospitalized or had abdominal surgery in the past 6 months? no    Does the patient use supplemental oxygen? No     Does the patient take Coumadin, Lovenox, Plavix, Elliquis, Xarelto, or other blood thinning medication? yes    Has the patient had a stroke, cardiac event, or stent placed in the past year? yes    SCHEDULING STAFF: If patient answers NO to above questions, then schedule procedure. If patient answers YES to above questions, then schedule office appointment. If patient is between 45yrs - 49yrs, please advise patient that we will have to confirm benefits & coverage with their insurance company for a routine screening colonoscopy.

## 2024-01-04 ENCOUNTER — HOSPITAL ENCOUNTER (OUTPATIENT)
Dept: RADIOLOGY | Facility: MEDICAL CENTER | Age: 69
Discharge: HOME | End: 2024-01-04
Payer: COMMERCIAL

## 2024-01-04 DIAGNOSIS — Z12.2 SCREENING FOR LUNG CANCER: ICD-10-CM

## 2024-01-04 DIAGNOSIS — Z13.6 SCREENING FOR AAA (ABDOMINAL AORTIC ANEURYSM): ICD-10-CM

## 2024-01-04 PROCEDURE — 76706 US ABDL AORTA SCREEN AAA: CPT

## 2024-01-04 PROCEDURE — 71271 CT THORAX LUNG CANCER SCR C-: CPT

## 2024-01-12 ENCOUNTER — TELEPHONE (OUTPATIENT)
Dept: FAMILY MEDICINE CLINIC | Facility: CLINIC | Age: 69
End: 2024-01-12

## 2024-01-12 DIAGNOSIS — I77.810 AORTIC ROOT DILATION (HCC): Primary | ICD-10-CM

## 2024-01-12 NOTE — TELEPHONE ENCOUNTER
Spoke  with patient's  daughter,  Katie.  Pt  has  emphysema and  lung  nodules  requires  repeat  ct  scan   in 3  months.  Has  f/u  appt  here  in  feb.  Also  has  dilated  ascending  aorta. 47 cm.  Needs  refer  to  cardiothoracic  surgeon

## 2024-01-18 ENCOUNTER — HOSPITAL ENCOUNTER (OUTPATIENT)
Dept: GASTROENTEROLOGY | Facility: HOSPITAL | Age: 69
Setting detail: OUTPATIENT SURGERY
End: 2024-01-18
Payer: COMMERCIAL

## 2024-01-18 ENCOUNTER — ANESTHESIA EVENT (OUTPATIENT)
Dept: GASTROENTEROLOGY | Facility: HOSPITAL | Age: 69
End: 2024-01-18

## 2024-01-18 ENCOUNTER — ANESTHESIA (OUTPATIENT)
Dept: GASTROENTEROLOGY | Facility: HOSPITAL | Age: 69
End: 2024-01-18

## 2024-01-18 VITALS
WEIGHT: 274.5 LBS | DIASTOLIC BLOOD PRESSURE: 60 MMHG | BODY MASS INDEX: 37.18 KG/M2 | OXYGEN SATURATION: 92 % | HEIGHT: 72 IN | RESPIRATION RATE: 17 BRPM | SYSTOLIC BLOOD PRESSURE: 111 MMHG | HEART RATE: 83 BPM | TEMPERATURE: 97.3 F

## 2024-01-18 DIAGNOSIS — Z12.11 SCREENING FOR COLON CANCER: ICD-10-CM

## 2024-01-18 PROCEDURE — 88305 TISSUE EXAM BY PATHOLOGIST: CPT | Performed by: PATHOLOGY

## 2024-01-18 PROCEDURE — 45385 COLONOSCOPY W/LESION REMOVAL: CPT | Performed by: INTERNAL MEDICINE

## 2024-01-18 RX ORDER — SODIUM CHLORIDE, SODIUM LACTATE, POTASSIUM CHLORIDE, CALCIUM CHLORIDE 600; 310; 30; 20 MG/100ML; MG/100ML; MG/100ML; MG/100ML
INJECTION, SOLUTION INTRAVENOUS CONTINUOUS PRN
Status: DISCONTINUED | OUTPATIENT
Start: 2024-01-18 | End: 2024-01-18

## 2024-01-18 RX ORDER — PROPOFOL 10 MG/ML
INJECTION, EMULSION INTRAVENOUS AS NEEDED
Status: DISCONTINUED | OUTPATIENT
Start: 2024-01-18 | End: 2024-01-18

## 2024-01-18 RX ADMIN — PROPOFOL 50 MG: 10 INJECTION, EMULSION INTRAVENOUS at 10:03

## 2024-01-18 RX ADMIN — PROPOFOL 50 MG: 10 INJECTION, EMULSION INTRAVENOUS at 10:17

## 2024-01-18 RX ADMIN — PROPOFOL 150 MG: 10 INJECTION, EMULSION INTRAVENOUS at 10:01

## 2024-01-18 RX ADMIN — PROPOFOL 50 MG: 10 INJECTION, EMULSION INTRAVENOUS at 10:06

## 2024-01-18 RX ADMIN — PROPOFOL 50 MG: 10 INJECTION, EMULSION INTRAVENOUS at 10:13

## 2024-01-18 RX ADMIN — SODIUM CHLORIDE, SODIUM LACTATE, POTASSIUM CHLORIDE, AND CALCIUM CHLORIDE: .6; .31; .03; .02 INJECTION, SOLUTION INTRAVENOUS at 09:30

## 2024-01-18 RX ADMIN — PROPOFOL 50 MG: 10 INJECTION, EMULSION INTRAVENOUS at 10:09

## 2024-01-18 NOTE — ANESTHESIA PREPROCEDURE EVALUATION
Procedure:  COLONOSCOPY    Relevant Problems   CARDIO   (+) Coronary artery disease involving native coronary artery of native heart without angina pectoris   (+) Hyperlipidemia   (+) Primary hypertension      PULMONARY   (+) COPD (chronic obstructive pulmonary disease) (HCC)        Physical Exam    Airway    Mallampati score: II  TM Distance: >3 FB  Neck ROM: full     Dental   No notable dental hx     Cardiovascular  Cardiovascular exam normal    Pulmonary  Pulmonary exam normal     Other Findings        Anesthesia Plan  ASA Score- 3     Anesthesia Type- IV sedation with anesthesia with ASA Monitors.         Additional Monitors:     Airway Plan:            Plan Factors-Exercise tolerance (METS): >4 METS.    Chart reviewed.   Existing labs reviewed. Patient summary reviewed.    Patient is a current smoker.  Patient smoked on day of surgery.    Obstructive sleep apnea risk education given perioperatively.        Induction-     Postoperative Plan-     Informed Consent- Anesthetic plan and risks discussed with patient.  I personally reviewed this patient with the CRNA. Discussed and agreed on the Anesthesia Plan with the CRNA..

## 2024-01-18 NOTE — ANESTHESIA POSTPROCEDURE EVALUATION
Post-Op Assessment Note    CV Status:  Stable  Pain Score: 0    Pain management: adequate       Mental Status:  Sleepy and arousable   Hydration Status:  Stable   PONV Controlled:  Controlled   Airway Patency:  Patent     Post Op Vitals Reviewed: Yes      Staff: CRNA               BP   128/64   Temp      Pulse  81   Resp   12   SpO2   97

## 2024-01-23 PROCEDURE — 88305 TISSUE EXAM BY PATHOLOGIST: CPT | Performed by: PATHOLOGY

## 2024-02-02 ENCOUNTER — CONSULT (OUTPATIENT)
Dept: PLASTIC SURGERY | Facility: CLINIC | Age: 69
End: 2024-02-02

## 2024-02-02 VITALS
HEIGHT: 71 IN | HEART RATE: 73 BPM | BODY MASS INDEX: 37.1 KG/M2 | SYSTOLIC BLOOD PRESSURE: 134 MMHG | TEMPERATURE: 98.6 F | WEIGHT: 265 LBS | DIASTOLIC BLOOD PRESSURE: 87 MMHG

## 2024-02-02 DIAGNOSIS — D17.0 LIPOMA OF FACE: Primary | ICD-10-CM

## 2024-02-15 NOTE — PROGRESS NOTES
"[facility]  [address], [address], [place] [address]  [phone]    Patient Identification: Javier Conley is a 68 y.o. male     History of Present Illness: The patient is a 68 y.o.  year-old male  who presents to the office for a new patient consult regarding a facial mass. Pt has family present to provide translation.  Patient states the left temple mass has been present for \"years\" and has slowly grown larger over time. He denies pain, discharge or history of infection to the area. Pt states he had similar masses removed in the past to which he believes were lipomas. He denies any headaches, vision changes, numbness, tingling, or other symptoms at this time. Pt states he has cardiac and pulmonary conditions (AAA, pulmonary nodules) that he is receiving work up for. Pt smokes 1 ppd.    Past Medical History:   Diagnosis Date    Coronary artery disease     Hyperlipidemia     Hypertension       Review of Systems  Constitutional: Denies fevers, chills or pain.  Skin: Denies any warmth, erythema, edema or mucopurulent drainage.     Physical Exam  Vitals:    02/02/24 1456   BP: 134/87   Pulse: 73   Temp: 98.6 °F (37 °C)     Constitutional:AAOx3, well-developed, no distress. Pt is cooperative and pleasant.  Eyes: Pupils are equal, round, and reactive to light. EOM in tact. Clear conjunctiva  Cardiovascular: Normal rate, regular rhythm.  Pulmonary/Chest: Effort normal and breath sounds normal. No respiratory distress.  Neuro: Gait in tact. Reflexes and motor strength normal and symmetric. Cranial nerved 2-12 grossly intact  Psychiatric: Has appropriate behavior and thought process.   Extremities: Full ROM, no gross abnormalities.  Skin: Left  temple mass measuring 1.5x 1.5cm. Soft, circumferential, mobile. Also a palpable mass left sublingual chin, 1cm.    Assessment and Plan:  The patient is an 68 y.o.  year-old male who presents to the office for new patient consult " regarding a facial mass.  -At today's visit left temple mass was evaluated, consistent with a lipoma. Discussed benign nature and options of care. Pt would like to move forward with excision. Reviewed the risks of the procedure, such as bleeding, infection, asymmetry, contour deformity, scarring, wound healing problems, anesthesia risks, as well as possibility of subsequent procedures. Day of procedure and post-operative course was reviewed with the patient.   -Will plan for excision of left temple lipoma with clearance from cardiology and pulmonology.  -As for left sublingual chin mass, would like imaging to better determine composition. Lipoma VS enlarged lymph node. Pt agrees. US head and neck ordered.   -The patient is to return in 3-4 weeks to review US results and discuss next steps.  -The patient is to call the office with any questions or concerns. All of the patient's questions were answered at this time and they agree with the plan of care.      Chika Meek PA-C  [facility]

## 2024-02-18 ENCOUNTER — RA CDI HCC (OUTPATIENT)
Dept: OTHER | Facility: HOSPITAL | Age: 69
End: 2024-02-18

## 2024-02-20 ENCOUNTER — OFFICE VISIT (OUTPATIENT)
Dept: CARDIAC SURGERY | Facility: CLINIC | Age: 69
End: 2024-02-20
Payer: COMMERCIAL

## 2024-02-20 VITALS
WEIGHT: 279.8 LBS | SYSTOLIC BLOOD PRESSURE: 124 MMHG | OXYGEN SATURATION: 94 % | BODY MASS INDEX: 39.17 KG/M2 | TEMPERATURE: 97.6 F | HEIGHT: 71 IN | HEART RATE: 72 BPM | DIASTOLIC BLOOD PRESSURE: 77 MMHG

## 2024-02-20 DIAGNOSIS — I71.21 ANEURYSM OF ASCENDING AORTA WITHOUT RUPTURE (HCC): Primary | ICD-10-CM

## 2024-02-20 DIAGNOSIS — E66.01 OBESITY, MORBID (HCC): ICD-10-CM

## 2024-02-20 DIAGNOSIS — R06.09 EXERTIONAL DYSPNEA: ICD-10-CM

## 2024-02-20 DIAGNOSIS — J43.9 PULMONARY EMPHYSEMA, UNSPECIFIED EMPHYSEMA TYPE (HCC): ICD-10-CM

## 2024-02-20 DIAGNOSIS — R07.9 EXERTIONAL CHEST PAIN: ICD-10-CM

## 2024-02-20 DIAGNOSIS — F17.200 HEAVY SMOKER: ICD-10-CM

## 2024-02-20 DIAGNOSIS — I25.10 CORONARY ARTERY DISEASE INVOLVING NATIVE CORONARY ARTERY OF NATIVE HEART WITHOUT ANGINA PECTORIS: ICD-10-CM

## 2024-02-20 DIAGNOSIS — I35.1 AORTIC VALVE INSUFFICIENCY, ETIOLOGY OF CARDIAC VALVE DISEASE UNSPECIFIED: ICD-10-CM

## 2024-02-20 PROCEDURE — 99204 OFFICE O/P NEW MOD 45 MIN: CPT | Performed by: STUDENT IN AN ORGANIZED HEALTH CARE EDUCATION/TRAINING PROGRAM

## 2024-02-20 NOTE — PROGRESS NOTES
"Consultation - Cardiothoracic Surgery   Javier Conley 68 y.o. male MRN: [id number]    Physician Requesting Consult/PCP: Ada Baugh PA-C    Cardiologist: Freddy Waddell,      Reason for Consult / Principal Problem: Ascending aortic aneurysm    History of Present Illness: Javier Conley is a 68  year old [place] male who presents today for surgical consultation for ascending aortic aneurysm.  He has a known PMH of CAD s/p PCI/POBA of diagonal and ramus 2019 at [facility], hyperlipidemia, hypertension, tobacco abuse (1 PPD x 50 years), COPD/emphysema, morbid obesity BMI 39,, hx of basal cell skin cancer, hx right neck mass resection (unclear details patient says benign), left temple mass (recently evaluated by plastic surgery, likely benign lipoma, planning for resection, left subinguinal chin mass (US head and neck ordered by plastic surgery for eval, suspicious for lipoma vs enlarged lymph node).     Given the patient's smoking history he underwent CT chest lung screening without contrast 24.  This showed severe background emphysema and a suspicious lung nodule in the right upper lobe measuring 8mm.  Incidentally noted was fusiform enlargement of the ascending aorta measuring 4.7 cm, as well as extensive coronary artery calcifications. He was then referred to cardiac surgery for evaluation.     He endorses both exertional dyspnea and chest pain, but mostly dyspnea on walking as short as 100 m and going up stairs.  Denies any at rest.  He denies fever/chills.  Denies abdominal pain.  His hematuria, hematemesis, and rectal bleeding. Of note he had an echo in 2019 at [facility] that showed EF 50-55%, and mild-moderate AI. No recent echos. He is due to see cardiology at the end of the month.     Denies family history of aneurysms.  Father  of bowel cancer at age 82, mother  of \"lung problems\" at age 86.  He is a retired .  Is in house with his wife and performs ADLs " and drives.       Past Medical History:  Past Medical History:   Diagnosis Date    Class 2 obesity     COPD (chronic obstructive pulmonary disease) with emphysema (HCC)     Coronary artery disease     Heavy smoker     Hyperlipidemia     Hypertension        Past Surgical History:   Past Surgical History:   Procedure Laterality Date    COLONOSCOPY  01/18/2024       Family History:  Family History   Problem Relation Age of Onset    Hypertension Mother     Stomach cancer Sister        Social History:  Social History     Substance and Sexual Activity   Alcohol Use Yes    Comment: socially     Social History     Substance and Sexual Activity   Drug Use Never     Social History     Tobacco Use   Smoking Status Every Day    Current packs/day: 1.00    Average packs/day: 1 pack/day for 50.0 years (50.0 ttl pk-yrs)    Types: Cigarettes   Smokeless Tobacco Never       Marital Status: [unfilled]    Home Medications:   Prior to Admission medications    Medication Sig Start Date End Date Taking? Authorizing Provider   Aspirin 81 MG CAPS Take 81 mg by mouth daily    Historical Provider, MD   atorvastatin (LIPITOR) 80 mg tablet Take 1 tablet (80 mg total) by mouth daily 11/21/23   Ada Baugh PA-C   metoprolol succinate (TOPROL-XL) 50 mg 24 hr tablet Take 1 tablet (50 mg total) by mouth daily 11/21/23   Ada Baugh PA-C   olmesartan-hydrochlorothiazide (BENICAR HCT) 20-12.5 MG per tablet Take 1 tablet by mouth daily 11/21/23   Ada Baugh PA-C       Allergies:  No Known Allergies    Review of Systems:   Review of Systems   Constitutional:  Negative for chills and fever.   HENT:  Negative for trouble swallowing.    Eyes:  Negative for visual disturbance.   Respiratory:  Positive for shortness of breath.    Cardiovascular:  Positive for chest pain. Negative for palpitations and leg swelling.   Gastrointestinal:  Negative for abdominal distention, abdominal pain, anal bleeding, blood in stool, nausea and vomiting.  "  Genitourinary:  Negative for difficulty urinating, dysuria and hematuria.   Musculoskeletal:  Negative for back pain, myalgias and neck pain.   Skin:  Negative for rash.   Neurological:  Negative for dizziness, seizures, syncope, light-headedness and headaches.   Psychiatric/Behavioral:  Negative for agitation and behavioral problems.        Vital Signs:     Vitals:    02/20/24 1413 02/20/24 1414   BP: 133/72 124/77   BP Location: Left arm Right arm   Patient Position: Sitting Sitting   Cuff Size: Large Large   Pulse: 72    Temp: 97.6 °F (36.4 °C)    TempSrc: Tympanic    SpO2: 94%    Weight: 127 kg (279 lb 12.8 oz)    Height: 5' 11\" (1.803 m)        Physical Exam:   Physical Exam  Constitutional:       Appearance: He is obese.   HENT:      Head: Normocephalic and atraumatic.      Mouth/Throat:      Mouth: Mucous membranes are moist.      Pharynx: Oropharynx is clear.   Neck:      Vascular: No carotid bruit.   Cardiovascular:      Rate and Rhythm: Normal rate.      Pulses:           Radial pulses are 2+ on the right side and 2+ on the left side.        Dorsalis pedis pulses are 2+ on the right side and 2+ on the left side.      Heart sounds: Heart sounds are distant.   Musculoskeletal:      Cervical back: Neck supple. No tenderness.   Neurological:      Mental Status: He is alert.         Lab Results:         Lab Results   Component Value Date    HGBA1C 5.9 (H) 05/22/2019       Imaging Studies:     CT chest lung cancer screening w/o contrast:   IMPRESSION:  Severe background emphysema with bilateral nodules as described. The 8 mm cystic nodule in the posterior right upper lobe requires short-term follow-up CT scan in 3 months.     Lung-RADS 4A, suspicious.  Three-month followup LDCT     Fusiform aneurysmal enlargement of the ascending thoracic aorta measuring up to 47 mm. Recommendation is for cardiothoracic surgery consultation.     Extensive coronary artery calcifications indicative of atherosclerotic heart " disease.      US abdominal aorta screening AAA: 1/4/2024  IMPRESSION:  No evidence for abdominal aortic aneurysm.  Top normal caliber/mild ectasia of the mid infrarenal abdominal aorta.    I have personally reviewed pertinent reports.   and I have personally reviewed pertinent films in PACS    Echo: 9/2019  LVEF 50-55%  Mild to moderate aortic regurgitation    Echo: 5/2019  Interpretation Summary  The qualitative LV ejection fraction is 35-39% (moderately reduced).  There is moderate diffuse left ventricular hypokinesis.  Left ventricular diastolic dysfunction is present when systolic function is reduced.  The left ventricular diastolic fillling pressure is normal.  Mild mitral regurgitation is present.  Mild aortic valve sclerosis is present.  Mild aortic valve regurgitation is present  The right ventricular systolic function is normal as assessed by tricuspid annular plane systolic excursion (TAPSE) (normal >1.7cm).       Cardiac cath with PCI 5/2019: ([place])  Distal left main stenosis which is not hemodynamically significant as assessed by FFR (0.87)  Severe small sized ramus 95% and small sized diagonal 95% stenosis  Successful POBA of ramus and D1 (multiple prolonged inflations with 2.25x12 NC balloon)TR band for hemostasisRecommend DAPT for 1 year in addition to GDMT and lifestyle modifications Heparin drip for another 24 hours then DC (until tomorrow morning)Discussed with Dr. Gutiérrez     Assessment:  - ascending aorta aneurysm, 4.7 cm  - heavy active smoker  - COPD/emphysema, severe by CT chest  - CAD s/p PCI/POBA to Diag and Ramus 5/2019 at [facility]  - mild-moderate AI by echo 9/2019 at [facility]  - exertional dyspnea and chest pain  - morbid obesity, BMI 39    Plan:    Javier Conley has an ascending aorta measuring 4.7cm in size at its greatest diameter. As they are asymptomatic, with no family history follow-up monitoring is the treatment plan.  Arrangements have been made for future  surveillance to be completed with CTA chest/abdomen/pelvis in 6 months and echo in 6 months.    He is due to see cardiology in the month.  Given his exertional shortness shortness of breath and chest pain, cardiology to determine further evaluation at their visit, may need echo, stress testing and/or cardiac catheterization after that visit, depending on the findings could have further surgical indications.     He should also follow-up with pulmonary for both his lung nodule and severe emphysema seen on CT chest, will defer this to PCP, would need full PFTs eventually if surgery were to be recommended.     Smoking cessation was strongly encouraged.     Javier Conley was comfortable with our recommendations, and their questions were answered to their satisfaction.  Thank you for allowing us to participate in the care of this patient.     Aortic Aneurysm Instructions were provided to the patient as follows:    1. No heavy sustained lifting which would require excessive straining  2. Maintain a controlled blood pressure with a goal of 120/80.  3. Follow up in Aortic Clinic as recommended with radiology follow up as instructed  4. Report to the ER or call 911 immediately with the following signs / symptoms: sudden onset of back pain, chest pain or shortness of breath.  5. Smoking cessation emphasized    The patient recently had a screening colonoscopy in 2024.  Therefore GI referral is not indicated at this time.     SIGNATURE: Michael Ordonez PA-C  DATE: 02/20/24  TIME: 3:10 PM    * This note was completed in part utilizing [employer] direct voice recognition software.   Grammatical errors, random word insertion, spelling mistakes, and incomplete sentences may be an occasional consequence of the system secondary to software limitations, ambient noise and hardware issues. At the time of dictation, efforts were made to edit, clarify and /or correct errors. Please read the chart carefully and recognize, using  context, where substitutions have occurred.  If you have any questions or concerns about the context, text or information contained within the body of this dictation, please contact myself, the provider, for further clarification.

## 2024-02-26 ENCOUNTER — TELEPHONE (OUTPATIENT)
Age: 69
End: 2024-02-26

## 2024-02-26 ENCOUNTER — OFFICE VISIT (OUTPATIENT)
Dept: FAMILY MEDICINE CLINIC | Facility: CLINIC | Age: 69
End: 2024-02-26
Payer: COMMERCIAL

## 2024-02-26 VITALS
HEART RATE: 83 BPM | HEIGHT: 68 IN | DIASTOLIC BLOOD PRESSURE: 78 MMHG | TEMPERATURE: 97.8 F | WEIGHT: 283 LBS | BODY MASS INDEX: 42.89 KG/M2 | OXYGEN SATURATION: 94 % | SYSTOLIC BLOOD PRESSURE: 122 MMHG

## 2024-02-26 DIAGNOSIS — M25.50 PAIN IN JOINT, MULTIPLE SITES: ICD-10-CM

## 2024-02-26 DIAGNOSIS — J44.9 CHRONIC OBSTRUCTIVE PULMONARY DISEASE, UNSPECIFIED COPD TYPE (HCC): ICD-10-CM

## 2024-02-26 DIAGNOSIS — M79.18 MYALGIA, MULTIPLE SITES: ICD-10-CM

## 2024-02-26 DIAGNOSIS — I10 PRIMARY HYPERTENSION: ICD-10-CM

## 2024-02-26 DIAGNOSIS — I25.10 CORONARY ARTERY DISEASE INVOLVING NATIVE CORONARY ARTERY OF NATIVE HEART WITHOUT ANGINA PECTORIS: ICD-10-CM

## 2024-02-26 DIAGNOSIS — E66.01 OBESITY, MORBID (HCC): ICD-10-CM

## 2024-02-26 DIAGNOSIS — I71.21 ANEURYSM OF ASCENDING AORTA WITHOUT RUPTURE (HCC): Primary | ICD-10-CM

## 2024-02-26 DIAGNOSIS — E78.5 HYPERLIPIDEMIA, UNSPECIFIED HYPERLIPIDEMIA TYPE: ICD-10-CM

## 2024-02-26 PROCEDURE — 99214 OFFICE O/P EST MOD 30 MIN: CPT | Performed by: PHYSICIAN ASSISTANT

## 2024-02-26 RX ORDER — NAPROXEN 500 MG/1
500 TABLET ORAL 2 TIMES DAILY PRN
Qty: 30 TABLET | Refills: 0 | Status: SHIPPED | OUTPATIENT
Start: 2024-02-26

## 2024-02-26 NOTE — PROGRESS NOTES
=Assessment/Plan:     Diagnoses and all orders for this visit:    Aneurysm of ascending aorta without rupture (HCC)  Comments:  Follow-up with cardiothoracic surgeon as directed    Coronary artery disease involving native coronary artery of native heart without angina pectoris  Comments:  No angina.  No signs of congestive heart failure.  Has appointment with cardiology    Primary hypertension  Comments:  Pressure is well-controlled.  Orders:  -     CBC and differential    Hyperlipidemia, unspecified hyperlipidemia type  Comments:  Decrease atorvastatin to 40 mg.  Patient is having some muscle pains and joint pains  Orders:  -     Comprehensive metabolic panel  -     Lipid panel    Obesity, morbid (HCC)  Comments:  Diet and exercise to promote weight loss    Chronic obstructive pulmonary disease, unspecified COPD type ([facility])  Comments:  Refer to pulmonary.  T  Orders:  -     Ambulatory Referral to Pulmonology; Future    Myalgia, multiple sites  Comments:  Decrease Lipitor to 40 mg.  May take milligrams every other day    Pain in joint, multiple sites  -     naproxen (Naprosyn) 500 mg tablet; Take 1 tablet (500 mg total) by mouth 2 (two) times a day as needed for mild pain          Subjective:      Patient ID: Javier Conley is a 68 y.o. male.    Patient presents in the office with his family for follow-up chronic conditions.  Patient has known CAD and hypertension.  He is on a baby aspirin daily.  His blood pressure is well-controlled with olmesartan-HCTZ 20-12 0.5 and metoprolol ER 50 mg.  Hyperlipidemia he is on atorvastatin 80 mg.  Had a lung CT scan for screening for lung cancer.  He was found to have emphysema.  He was also found to have a dilated sending aorta.  Patient did not see the cardiothoracic surgeon.  Will repeating this test in 6 months.  Does have a follow-up appointment with cardiology.  He does have emphysema.  Currently not on any inhalers.  He is short of breath with exertion.  Will start  him on Spiriva inhaled daily.  Refer to pulmonary.        The following portions of the patient's history were reviewed and updated as appropriate: He   Patient Active Problem List    Diagnosis Date Noted    Aneurysm of ascending aorta without rupture ([facility]) 02/20/2024    Aortic valve regurgitation 02/20/2024    Exertional dyspnea 02/20/2024    Exertional chest pain 02/20/2024    Heavy smoker 02/20/2024    COPD (chronic obstructive pulmonary disease) ([facility]) 11/21/2023    Coronary artery disease involving native coronary artery of native heart without angina pectoris 04/04/2023    Hyperlipidemia 04/04/2023    Primary hypertension 04/04/2023    Hx of skin cancer, basal cell 04/04/2023    Obesity, morbid ([facility]) 04/04/2023     Current Outpatient Medications   Medication Sig Dispense Refill    Aspirin 81 MG CAPS Take 81 mg by mouth daily      atorvastatin (LIPITOR) 80 mg tablet Take 1 tablet (80 mg total) by mouth daily 90 tablet 0    metoprolol succinate (TOPROL-XL) 50 mg 24 hr tablet Take 1 tablet (50 mg total) by mouth daily 90 tablet 1    naproxen (Naprosyn) 500 mg tablet Take 1 tablet (500 mg total) by mouth 2 (two) times a day as needed for mild pain 30 tablet 0    olmesartan-hydrochlorothiazide (BENICAR HCT) 20-12.5 MG per tablet Take 1 tablet by mouth daily 90 tablet 1     No current facility-administered medications for this visit.     He has No Known Allergies..    Review of Systems   Constitutional:  Negative for activity change, appetite change, chills, fatigue and fever.   HENT:  Negative for ear pain and sore throat.    Eyes:  Negative for visual disturbance.   Respiratory:  Positive for shortness of breath. Negative for cough.    Cardiovascular:  Positive for chest pain. Negative for palpitations and leg swelling.        Swelling  hands   Gastrointestinal:  Negative for abdominal pain, blood in stool, constipation, diarrhea and nausea.   Genitourinary:  Negative for difficulty urinating.   Musculoskeletal:   Positive for arthralgias, joint swelling and myalgias. Negative for back pain.   Skin:  Negative for rash.   Neurological:  Negative for dizziness, syncope and headaches.   Psychiatric/Behavioral:  Negative for self-injury, sleep disturbance and suicidal ideas. The patient is not nervous/anxious.          Objective:        Physical Exam  Vitals and nursing note reviewed.   Constitutional:       General: He is not in acute distress.     Appearance: He is well-developed. He is obese. He is not ill-appearing.   HENT:      Head: Normocephalic and atraumatic.      Right Ear: Tympanic membrane, ear canal and external ear normal.      Left Ear: Tympanic membrane, ear canal and external ear normal.   Eyes:      Conjunctiva/sclera: Conjunctivae normal.      Pupils: Pupils are equal, round, and reactive to light.   Neck:      Thyroid: No thyromegaly.      Vascular: No carotid bruit.   Cardiovascular:      Rate and Rhythm: Normal rate and regular rhythm.      Heart sounds: Normal heart sounds. No murmur heard.  Pulmonary:      Effort: Pulmonary effort is normal.      Breath sounds: Normal breath sounds. No wheezing.   Musculoskeletal:      Right lower leg: No edema.      Left lower leg: No edema.      Comments: C-spine is nontender with good range of motion.  Right and left hands are edematous.  His is nonpitting.  He has no upper limb PIP or DIP changes.   Lymphadenopathy:      Cervical: No cervical adenopathy.   Skin:     General: Skin is warm and dry.   Neurological:      General: No focal deficit present.      Mental Status: He is alert and oriented to person, place, and time.   Psychiatric:         Mood and Affect: Mood normal.         Behavior: Behavior normal.         Thought Content: Thought content normal.         Judgment: Judgment normal.

## 2024-02-27 ENCOUNTER — TELEPHONE (OUTPATIENT)
Age: 69
End: 2024-02-27

## 2024-02-27 NOTE — TELEPHONE ENCOUNTER
Rec'd call from patients wife requesting to reschedule tomorrows PRE-OP appointment.    I informed her that the surgery scheduler/coordinator would have to return her call to reschedule appointment.    Understanding was verbalized.    (Unsure of who is performing procedure, so I'm sending to clinical team.)

## 2024-03-07 ENCOUNTER — HOSPITAL ENCOUNTER (OUTPATIENT)
Dept: RADIOLOGY | Facility: MEDICAL CENTER | Age: 69
Discharge: HOME | End: 2024-03-07
Payer: COMMERCIAL

## 2024-03-07 DIAGNOSIS — D17.0 LIPOMA OF FACE: ICD-10-CM

## 2024-03-07 PROCEDURE — 76536 US EXAM OF HEAD AND NECK: CPT

## 2024-03-08 NOTE — TELEPHONE ENCOUNTER
Attempt #2 to schedule patient for appt, spoke with daughter she is going to call back and schedule her mother and father at the same time.

## 2024-03-20 ENCOUNTER — CONSULT (OUTPATIENT)
Dept: PULMONOLOGY | Facility: CLINIC | Age: 69
End: 2024-03-20
Payer: COMMERCIAL

## 2024-03-20 VITALS
HEART RATE: 96 BPM | DIASTOLIC BLOOD PRESSURE: 82 MMHG | OXYGEN SATURATION: 93 % | TEMPERATURE: 99.1 F | SYSTOLIC BLOOD PRESSURE: 130 MMHG

## 2024-03-20 DIAGNOSIS — R93.89 ABNORMAL CT OF THE CHEST: Primary | ICD-10-CM

## 2024-03-20 DIAGNOSIS — Z71.6 TOBACCO ABUSE COUNSELING: ICD-10-CM

## 2024-03-20 DIAGNOSIS — J44.9 CHRONIC OBSTRUCTIVE PULMONARY DISEASE, UNSPECIFIED COPD TYPE (HCC): ICD-10-CM

## 2024-03-20 DIAGNOSIS — R91.1 PULMONARY NODULE: ICD-10-CM

## 2024-03-20 DIAGNOSIS — Z72.0 TOBACCO ABUSE: ICD-10-CM

## 2024-03-20 PROCEDURE — 99204 OFFICE O/P NEW MOD 45 MIN: CPT | Performed by: INTERNAL MEDICINE

## 2024-03-20 RX ORDER — VARENICLINE TARTRATE 0.5 MG/1
TABLET, FILM COATED ORAL DAILY
Qty: 151 TABLET | Refills: 0 | Status: SHIPPED | OUTPATIENT
Start: 2024-03-20 | End: 2024-05-01

## 2024-03-20 NOTE — PROGRESS NOTES
Pulmonary Consultation   Javier Conley 68 y.o. male MRN: [id number]  3/20/2024    Referring Physician or Provider:  Ada Baugh PA-C  [address] [place] [address]  [address] [place] [id number]       Chief Complaint:  CT chest abnormality    HPI:    68-year-old male with past medical history of tobacco abuse, obesity, coronary artery disease presents for evaluation of CT chest abnormality.  Patient denies any significant pulmonary symptoms such as cough or sputum production.  He states that he has difficulty ambulating and this has happened over several years.Exercise Tolerance: Able to ambulate 200 feet prior to feeling short of breath.  Currently smokes 1 pack/day and has smoked for approximately 50 years.  He is motivated to quit smoking.  He has never tried to quit smoking.      Past Medical Hx  Past Medical History:   Diagnosis Date    Class 2 obesity     COPD (chronic obstructive pulmonary disease) with emphysema (HCC)     Coronary artery disease     Heavy smoker     Hyperlipidemia     Hypertension     Sarcoidosis            Past Surgical Hx  Past Surgical History:   Procedure Laterality Date    COLONOSCOPY  01/18/2024           Family Hx  Family History   Problem Relation Age of Onset    Hypertension Mother     Stomach cancer Sister            Occupational History:   No known previous inhalation injuries      Social History:   Social History     Socioeconomic History    Marital status: /Civil Union     Spouse name: Not on file    Number of children: Not on file    Years of education: Not on file    Highest education level: Not on file   Occupational History    Not on file   Tobacco Use    Smoking status: Every Day     Current packs/day: 1.00     Average packs/day: 1 pack/day for 50.0 years (50.0 ttl pk-yrs)     Types: Cigarettes     Passive exposure: Past    Smokeless tobacco: Never   Vaping Use    Vaping status: Never Used   Substance and Sexual Activity    Alcohol use: Yes     Alcohol/week:  5.0 standard drinks of alcohol     Types: 5 Cans of beer per week     Comment: socially    Drug use: Never    Sexual activity: Not on file   Other Topics Concern    Not on file   Social History Narrative    Not on file     Social Determinants of Health     Financial Resource Strain: Low Risk  (11/21/2023)    Overall Financial Resource Strain (CARDIA)     Difficulty of Paying Living Expenses: Not hard at all   Food Insecurity: Not on file   Transportation Needs: No Transportation Needs (11/21/2023)    PRAPARE - Transportation     Lack of Transportation (Medical): No     Lack of Transportation (Non-Medical): No   Physical Activity: Not on file   Stress: Not on file   Social Connections: Not on file   Intimate Partner Violence: Not on file   Housing Stability: Not on file            Pertinent Meds  No inhaled medications      ROS:  Constitutional: - Fatigue, - chills, - fever, - weight change.   HEENT: - rhinorrhea, - sneezing, - sore throat.    Respiratory: - cough, - sputum production, + ambulatory shortness of breath, - wheezing.    Cardiovascular: - chest pain,  -palpitations, - leg swelling.   Gastrointestinal: - abdominal pain, - constipation, - diarrhea, - nausea, - vomiting.   Endocrine: - cold intolerance, - heat intolerance.   Genitourinary: - dysuria.   Musculoskeletal: - arthralgias.   Skin:- rash, - wound.   Allergic/Immunologic: - allergies  Neurological: - dizziness, - numbness      Vitals: Blood pressure 130/82, pulse 96, temperature 99.1 °F (37.3 °C), temperature source Tympanic, SpO2 93%., There is no height or weight on file to calculate BMI.    Physical Exam  GEN  NAD  HEENT  ncat, non icteric, MM moist  NECK  supple, no JVD, no LAD  CV  +s1s2, no mrg, RRR  PULM diminished BS, otherwise CTA BL, no wrr  ABD  soft, nt, + obese, + BS  EXT 1+ LE pitting edema, no cyanosis, no clubbing  NEURO  Aox3, no focal weakness    Imaging and other studies     I have personally viewed and interpreted the following  "studies:  CT chest 1/4/2024 shows diffuse but apical predominant centrilobular and paraseptal emphysema.  Subcentimeter nodule present in right upper lobe.      Pulmonary function testing:   No PFTs available for interpretation      Lab Results, EKG, Pathology, and Other Studies:  No recent relevant labs    Assessment:  CT chest abnormality  Emphysema  Cystic lung nodule  Tobacco abuse  Tobacco cessation counseling    Plan:  Emphysema on CT Chest  Pt counseled for 12 minutes on the importance of tobacco cessation. Patient is encouraged to quit. I will prescribe Chantix. Patient's goal is to quite prior to the next visit  Repeat CT Chest 3 months from previous (next occurrence will be in 4/4/24).  Check full PFT's    Return visit in mid to late April, after CT, PFT's  On next visit we will evaluate symptoms, CT results, PFT results,     Note: Portions of the record may have been created with voice recognition software. Occasional wrong word or \"sound a like\" substitutions may have occurred due to the inherent limitations of voice recognition software. Read the chart carefully and recognize, using context, where substitutions have occurred.     Kaiser Tran M.D.  [facility]    "

## 2024-03-21 ENCOUNTER — OFFICE VISIT (OUTPATIENT)
Dept: PLASTIC SURGERY | Facility: CLINIC | Age: 69
End: 2024-03-21
Payer: COMMERCIAL

## 2024-03-21 DIAGNOSIS — R59.0 ENLARGED LYMPH NODE IN NECK: ICD-10-CM

## 2024-03-21 DIAGNOSIS — D17.0 LIPOMA OF FACE: Primary | ICD-10-CM

## 2024-03-21 PROCEDURE — 99213 OFFICE O/P EST LOW 20 MIN: CPT

## 2024-03-21 NOTE — PROGRESS NOTES
[facility]  [address], [address], [place] [address]  [phone]    Patient Identification: Javier Conley is a 68 y.o. male     History of Present Illness: The patient is a 68 y.o.  year-old male  who presents to the office to review US head and neck. Patient was seen on 2/23/2024 for a new patient consult regarding a facial lipoma upon the left temple. At that visit palpable mass in the anterior cervical/subinguinal lymph node chain was felt. US ordered for evaluation.  Pt denies any pain, fevers, chills, trouble swallowing, or new concerning symptoms.      Past Medical History:   Diagnosis Date    Class 2 obesity     COPD (chronic obstructive pulmonary disease) with emphysema (HCC)     Coronary artery disease     Heavy smoker     Hyperlipidemia     Hypertension     Sarcoidosis           Review of Systems  Constitutional: Denies fevers, chills or pain.  Skin: Denies any warmth, erythema, edema or mucopurulent drainage. Lipoma left temple, palpable left cervical lymph node    Physical Exam  Neck: Thyroid palpable, no goiter felt.  Skin: Left  temple lipoma measuring 1.5x 1.5cm. Palpable lymph node anterior cervical chain, roughly 1-2cm.      Narrative & Impression   HEAD AND NECK SOFT TISSUE ULTRASOUND     INDICATION: D17.0: Benign lipomatous neoplasm of skin and subcutaneous tissue of head, face and neck. Lump below left ear for 1 year increasing in size.     COMPARISON: None     TECHNIQUE: Real-time ultrasound of the left posterior was performed with a linear transducer with both volumetric sweeps and still imaging techniques.     FINDINGS:     The palpable abnormality corresponds to 2.2 x 1.4 x 1.2 cm and 1.9 x 1.8 x 0.6 cm lymph nodes. The smaller lymph node demonstrates multiple microcalcifications. Both lymph nodes demonstrate areas of low density which may represent fluid/necrosis.     IMPRESSION:     Mild left cervical lymphadenopathy. The presence of  microcalcifications raises the concern for metastatic disease such as a thyroid primary. Other benign etiologies include granulomatous infection or sarcoidosis. Recommend further evaluation with CT soft   tissue neck with contrast.       Assessment and Plan:  The patient is an 68 y.o.  year-old male who presents to the office for US review.    -At today's visit US head and neck results reviewed with the patient. Discussed findings, all questions answered. Recommend follow up with PCP for management in light of existing pulmonary nodules and sarcoidosis. Pt agrees with plan.  -The patient is to return once work up is complete and all pre-existing conditions are stabilized. May call the office with any concerns.  -The patient is to call the office with any questions or concerns. All of the patient's questions were answered at this time and they agree with the plan of care.      Chika Meek PA-C  [facility]

## 2024-03-22 ENCOUNTER — TELEPHONE (OUTPATIENT)
Dept: FAMILY MEDICINE CLINIC | Facility: CLINIC | Age: 69
End: 2024-03-22

## 2024-03-22 NOTE — TELEPHONE ENCOUNTER
----- Message from Chika Meek PA-C sent at 3/14/2024  9:48 AM EDT -----    ----- Message -----  From: Interface, Radiology Results In  Sent: 3/14/2024   9:36 AM EDT  To: Chika Meek PA-C

## 2024-03-26 ENCOUNTER — TELEPHONE (OUTPATIENT)
Dept: FAMILY MEDICINE CLINIC | Facility: CLINIC | Age: 69
End: 2024-03-26

## 2024-03-26 DIAGNOSIS — R59.0 LAD (LYMPHADENOPATHY) OF LEFT CERVICAL REGION: Primary | ICD-10-CM

## 2024-03-26 NOTE — TELEPHONE ENCOUNTER
Spoke   with pt's  daughter.  Pt has  suspicious  lymph nodes  of the neck  needs  ct  neck  soft  tissue.   He  is  already  scheduled  for  ct  of the  lungs.  She  will call to  see  if  they  can  do  both  tests  on the same day.

## 2024-04-02 ENCOUNTER — OFFICE VISIT (OUTPATIENT)
Dept: CARDIOLOGY CLINIC | Facility: MEDICAL CENTER | Age: 69
End: 2024-04-02
Payer: COMMERCIAL

## 2024-04-02 VITALS
BODY MASS INDEX: 42.74 KG/M2 | HEIGHT: 68 IN | OXYGEN SATURATION: 92 % | WEIGHT: 282 LBS | DIASTOLIC BLOOD PRESSURE: 80 MMHG | HEART RATE: 82 BPM | SYSTOLIC BLOOD PRESSURE: 124 MMHG

## 2024-04-02 DIAGNOSIS — I10 PRIMARY HYPERTENSION: ICD-10-CM

## 2024-04-02 DIAGNOSIS — I71.21 ANEURYSM OF ASCENDING AORTA WITHOUT RUPTURE (HCC): Primary | ICD-10-CM

## 2024-04-02 DIAGNOSIS — R06.09 DOE (DYSPNEA ON EXERTION): ICD-10-CM

## 2024-04-02 DIAGNOSIS — R07.9 CHEST PAIN, UNSPECIFIED TYPE: ICD-10-CM

## 2024-04-02 DIAGNOSIS — I25.10 CORONARY ARTERY DISEASE INVOLVING NATIVE CORONARY ARTERY OF NATIVE HEART WITHOUT ANGINA PECTORIS: ICD-10-CM

## 2024-04-02 PROCEDURE — 99203 OFFICE O/P NEW LOW 30 MIN: CPT | Performed by: INTERNAL MEDICINE

## 2024-04-02 PROCEDURE — 93000 ELECTROCARDIOGRAM COMPLETE: CPT | Performed by: INTERNAL MEDICINE

## 2024-04-02 NOTE — PROGRESS NOTES
Cardiology New Patient Visit     Javier Conley  [id number]  1955  [facility] [place]  [address] [place] [address]    Diagnoses and all orders for this visit:    Aneurysm of ascending aorta without rupture (HCC)    Primary hypertension  Comments:  Pressure is at goal continue current regimen  Orders:  -     Ambulatory Referral to Cardiology    Coronary artery disease involving native coronary artery of native heart without angina pectoris  Comments:  Complaining of some chest pain.  Refer to cardiology  Orders:  -     Ambulatory Referral to Cardiology    Chest pain, unspecified type  Comments:  EKG normal sinus rhythm.  Proceed with cardiology eval  Orders:  -     Ambulatory Referral to Cardiology  -     POCT ECG    LENTZ (dyspnea on exertion)    Chest pain, unspecified type  -     Ambulatory Referral to Cardiology  -     POCT ECG        I had the pleasure of seeing Javier Conley who presents to establish care/new visit    History of the Presenting Illness, Discussion/Summary and My Plan are as follows:::    Javier is a pleasant 68-year-old gentleman who speaks mostly Polish.  He has a history of coronary disease, balloon angioplasty-ramus and diagonal 1, distal left main stenosis which was deemed not hemodynamically significant at that time-all in 2019-I suspect in the setting of chest pains.    He also has a history of hypertension, dyslipidemia, remote DVT-around her prior to 2014, tobacco use-continues to smoke 8 to 10 cigarettes a day, a lung nodule under surveillance and mild to moderate AI on echocardiogram report from 2019.    Recent CT scan of his lungs showed a 4.7 cm fusiform aortic aneurysm for which she presents to establish and follow-up.    He is not acutely symptomatic from a cardiac standpoint, is moderately active.  He has shortness of breath with exertion which she feels is  very stable.  His BMI is 43.  He also has random chest tightness that lasts about 10 to 15 minutes without associated sweating or shortness of breath, without clear prostrating or relieving factors that has been stable for over an year and a half.    In the past he had followed at [facility] with Dr. Judd and presents to establish care since he lives in [place].    History was obtained through  services through an iPad.    Plan:     Coronary artery disease: See  anatomy below.  Offered stress testing in the setting of his chest pains although these have been stable and chronic, he pleasantly declined, his wife declined as well.  He feels his shortness of breath has been stable.    Mild to moderate AI on last echo-2019: Will have him complete his current echo-scheduled later this month.    Aortic aneurysm: At 4.7 cm, indexed to his body surface area of 2.5-1.9 cm/meters squared which is dilated, he has serial follow-up CT lung studies which will be CCed to me as well to assess for increase in size.  If stable at 6 months, will have him follow at 1 year  (It appears to have been stable in size, reported as 4.6 x 5 in 2019 on CT scan report on Care Everywhere)  He is 6 feet tall    Hypertension: Controlled    Dyslipidemia: On a statin and controlled    Follow-up in 6 months       Latest Reference Range & Units 11/21/23 14:14   Cholesterol See Comment mg/dL 115   Triglycerides See Comment mg/dL 127   HDL >=40 mg/dL 45   Non-HDL Cholesterol mg/dl 70   LDL Calculated 0 - 100 mg/dL 45      Latest Reference Range & Units 11/21/23 14:14   BUN 5 - 25 mg/dL 9   Creatinine 0.60 - 1.30 mg/dL 0.81      Latest Reference Range & Units 11/21/23 14:14   Hemoglobin 12.0 - 17.0 g/dL 15.5   Hematocrit 36.5 - 49.3 % 49.1      Latest Reference Range & Units 05/22/19 15:07   Hemoglobin A1C 4.0 - 5.6 % 5.9 (H) (E)   eAG, EST AVG Glucose <126  123 (E)   (H): Data is abnormally high  (E): External lab result    CT chest-lung  cancer screening-January 2024  Emphysema,  Fusiform aneurysmal enlargement of the ascending thoracic aorta measuring up to 47 mm .    [facility]  Echo 9/2019  LVEF 50-55%    PCI 5/2019 ([place])  Distal left main stenosis which is not hemodynamically significant as assessed by FFR (0.87)  Severe small sized ramus 95% and small sized diagonal 95% stenosis  Successful POBA of ramus and D1 (multiple prolonged inflations with 2.25x12 NC balloon)TR band for hemostasisRecommend DAPT for 1 year in addition to GDMT and lifestyle modifications Heparin drip for another 24 hours then DC (until tomorrow morning)Discussed with Dr. Gutiérrez     5/2019  Diagnostic cath @ [facility] ([place])-->sent to [facility] [place] [place] for CABG  360 subclavian   Nonobstructive disease of RCA  Indeterminate Left main  Severe diag/ramus stenosis   Mild to moderate aortic regurgitation   1. Aneurysm of ascending aorta without rupture (HCC)        2. Primary hypertension  Ambulatory Referral to Cardiology    Pressure is at goal continue current regimen      3. Coronary artery disease involving native coronary artery of native heart without angina pectoris  Ambulatory Referral to Cardiology    Complaining of some chest pain.  Refer to cardiology      4. Chest pain, unspecified type  Ambulatory Referral to Cardiology    POCT ECG    EKG normal sinus rhythm.  Proceed with cardiology eval      5. LENTZ (dyspnea on exertion)        6. Chest pain, unspecified type  Ambulatory Referral to Cardiology    POCT ECG        Patient Active Problem List   Diagnosis    Coronary artery disease involving native coronary artery of native heart without angina pectoris    Hyperlipidemia    Primary hypertension    Hx of skin cancer, basal cell    Obesity, morbid (HCC)    COPD (chronic obstructive pulmonary disease) (HCC)    Aneurysm of ascending aorta without rupture (HCC)    Aortic valve regurgitation    Exertional dyspnea    Exertional chest pain    Tobacco abuse     Abnormal CT of the chest    Pulmonary nodule    Tobacco abuse counseling    Chest pain    LENTZ (dyspnea on exertion)     Past Medical History:   Diagnosis Date    Class 2 obesity     COPD (chronic obstructive pulmonary disease) with emphysema (HCC)     Coronary artery disease     Heavy smoker     Hyperlipidemia     Hypertension     Sarcoidosis      Social History     Socioeconomic History    Marital status: /Civil Union     Spouse name: Not on file    Number of children: Not on file    Years of education: Not on file    Highest education level: Not on file   Occupational History    Not on file   Tobacco Use    Smoking status: Every Day     Current packs/day: 1.00     Average packs/day: 1 pack/day for 50.0 years (50.0 ttl pk-yrs)     Types: Cigarettes     Passive exposure: Past    Smokeless tobacco: Never   Vaping Use    Vaping status: Never Used   Substance and Sexual Activity    Alcohol use: Yes     Alcohol/week: 5.0 standard drinks of alcohol     Types: 5 Cans of beer per week     Comment: socially    Drug use: Never    Sexual activity: Not on file   Other Topics Concern    Not on file   Social History Narrative    Not on file     Social Determinants of Health     Financial Resource Strain: Low Risk  (11/21/2023)    Overall Financial Resource Strain (CARDIA)     Difficulty of Paying Living Expenses: Not hard at all   Food Insecurity: Not on file   Transportation Needs: No Transportation Needs (11/21/2023)    PRAPARE - Transportation     Lack of Transportation (Medical): No     Lack of Transportation (Non-Medical): No   Physical Activity: Not on file   Stress: Not on file   Social Connections: Not on file   Intimate Partner Violence: Not on file   Housing Stability: Not on file      Family History   Problem Relation Age of Onset    Hypertension Mother     Stomach cancer Sister      Past Surgical History:   Procedure Laterality Date    COLONOSCOPY  01/18/2024       Current Outpatient Medications:     Aspirin  "81 MG CAPS, Take 81 mg by mouth daily, Disp: , Rfl:     atorvastatin (LIPITOR) 80 mg tablet, Take 1 tablet (80 mg total) by mouth daily, Disp: 90 tablet, Rfl: 0    metoprolol succinate (TOPROL-XL) 50 mg 24 hr tablet, Take 1 tablet (50 mg total) by mouth daily, Disp: 90 tablet, Rfl: 1    naproxen (Naprosyn) 500 mg tablet, Take 1 tablet (500 mg total) by mouth 2 (two) times a day as needed for mild pain, Disp: 30 tablet, Rfl: 0    olmesartan-hydrochlorothiazide (BENICAR HCT) 20-12.5 MG per tablet, Take 1 tablet by mouth daily, Disp: 90 tablet, Rfl: 1    varenicline (CHANTIX) 0.5 mg tablet, Take 1 tablet (0.5 mg total) by mouth daily for 3 days, THEN 1 tablet (0.5 mg total) 2 (two) times a day for 4 days, THEN 2 tablets (1 mg total) 2 (two) times a day., Disp: 151 tablet, Rfl: 0  No Known Allergies  Vitals:    04/02/24 1520   BP: 124/80   BP Location: Left arm   Patient Position: Sitting   Cuff Size: Adult   Pulse: 82   SpO2: 92%   Weight: 128 kg (282 lb)   Height: 5' 8\" (1.727 m)           Imaging: US head neck soft tissue    Result Date: 3/14/2024  Narrative: HEAD AND NECK SOFT TISSUE ULTRASOUND INDICATION: D17.0: Benign lipomatous neoplasm of skin and subcutaneous tissue of head, face and neck. Lump below left ear for 1 year increasing in size. COMPARISON: None TECHNIQUE: Real-time ultrasound of the left posterior was performed with a linear transducer with both volumetric sweeps and still imaging techniques. FINDINGS: The palpable abnormality corresponds to 2.2 x 1.4 x 1.2 cm and 1.9 x 1.8 x 0.6 cm lymph nodes. The smaller lymph node demonstrates multiple microcalcifications. Both lymph nodes demonstrate areas of low density which may represent fluid/necrosis.     Impression: Mild left cervical lymphadenopathy. The presence of microcalcifications raises the concern for metastatic disease such as a thyroid primary. Other benign etiologies include granulomatous infection or sarcoidosis. Recommend further evaluation " "with CT soft  tissue neck with contrast. The study was marked in EPIC for significant notification. Workstation performed: [id number]       Review of Systems:  Review of Systems   Constitutional: Negative.    HENT: Negative.     Eyes: Negative.    Respiratory:  Positive for chest tightness and shortness of breath. Negative for apnea, cough, choking, wheezing and stridor.    Cardiovascular: Negative.    Gastrointestinal:  Negative for abdominal distention.   Endocrine: Negative.    Musculoskeletal: Negative.        Physical Exam:  /80 (BP Location: Left arm, Patient Position: Sitting, Cuff Size: Adult)   Pulse 82   Ht 5' 8\" (1.727 m)   Wt 128 kg (282 lb)   SpO2 92%   BMI 42.88 kg/m²   Physical Exam  Constitutional:       General: He is not in acute distress.     Appearance: He is not ill-appearing, toxic-appearing or diaphoretic.   HENT:      Nose: Nose normal. No congestion or rhinorrhea.   Neck:      Vascular: No carotid bruit.   Cardiovascular:      Pulses: Normal pulses.      Heart sounds: No murmur heard.     No friction rub. No gallop.   Pulmonary:      Effort: Pulmonary effort is normal. No respiratory distress.      Breath sounds: No stridor. No wheezing or rhonchi.   Abdominal:      General: Abdomen is flat. There is distension.      Palpations: There is no mass.   Musculoskeletal:         General: No swelling, tenderness, deformity or signs of injury. Normal range of motion.      Cervical back: Normal range of motion. No rigidity or tenderness.   Lymphadenopathy:      Cervical: No cervical adenopathy.   Neurological:      Mental Status: He is alert.         This note was completed in part utilizing [employer] direct voice recognition software.   Grammatical errors, random word insertion, spelling mistakes, occasional wrong word or \"sound-alike\" substitutions and incomplete sentences may be an occasional consequence of the system secondary to software limitations, ambient noise and " hardware issues. At the time of dictation, efforts were made to edit, clarify and /or correct errors.  Please read the chart carefully and recognize, using context, where substitutions have occurred.  If you have any questions or concerns about the context, text or information contained within the body of this dictation, please contact myself, the provider, for further clarification.

## 2024-04-05 ENCOUNTER — HOSPITAL ENCOUNTER (OUTPATIENT)
Dept: PULMONOLOGY | Facility: HOSPITAL | Age: 69
End: 2024-04-05
Attending: INTERNAL MEDICINE
Payer: COMMERCIAL

## 2024-04-05 ENCOUNTER — TELEPHONE (OUTPATIENT)
Age: 69
End: 2024-04-05

## 2024-04-05 ENCOUNTER — HOSPITAL ENCOUNTER (OUTPATIENT)
Dept: CT IMAGING | Facility: HOSPITAL | Age: 69
Discharge: HOME | End: 2024-04-05
Attending: INTERNAL MEDICINE
Payer: COMMERCIAL

## 2024-04-05 DIAGNOSIS — R93.89 ABNORMAL CT OF THE CHEST: ICD-10-CM

## 2024-04-05 PROCEDURE — 71250 CT THORAX DX C-: CPT

## 2024-04-05 PROCEDURE — 94760 N-INVAS EAR/PLS OXIMETRY 1: CPT

## 2024-04-05 PROCEDURE — 94729 DIFFUSING CAPACITY: CPT

## 2024-04-05 PROCEDURE — 94060 EVALUATION OF WHEEZING: CPT | Performed by: INTERNAL MEDICINE

## 2024-04-05 PROCEDURE — 94060 EVALUATION OF WHEEZING: CPT

## 2024-04-05 PROCEDURE — 94726 PLETHYSMOGRAPHY LUNG VOLUMES: CPT | Performed by: INTERNAL MEDICINE

## 2024-04-05 PROCEDURE — 94618 PULMONARY STRESS TESTING: CPT | Performed by: INTERNAL MEDICINE

## 2024-04-05 PROCEDURE — 94729 DIFFUSING CAPACITY: CPT | Performed by: INTERNAL MEDICINE

## 2024-04-05 PROCEDURE — 94726 PLETHYSMOGRAPHY LUNG VOLUMES: CPT

## 2024-04-05 RX ORDER — ALBUTEROL SULFATE 2.5 MG/3ML
2.5 SOLUTION RESPIRATORY (INHALATION) ONCE
Status: COMPLETED | OUTPATIENT
Start: 2024-04-05 | End: 2024-04-05

## 2024-04-05 RX ADMIN — ALBUTEROL SULFATE 2.5 MG: 2.5 SOLUTION RESPIRATORY (INHALATION) at 10:29

## 2024-04-08 ENCOUNTER — TELEPHONE (OUTPATIENT)
Dept: PULMONOLOGY | Facility: CLINIC | Age: 69
End: 2024-04-08

## 2024-04-24 ENCOUNTER — OFFICE VISIT (OUTPATIENT)
Dept: PULMONOLOGY | Facility: CLINIC | Age: 69
End: 2024-04-24
Payer: COMMERCIAL

## 2024-04-24 VITALS
HEIGHT: 68 IN | WEIGHT: 282 LBS | DIASTOLIC BLOOD PRESSURE: 80 MMHG | SYSTOLIC BLOOD PRESSURE: 128 MMHG | OXYGEN SATURATION: 94 % | HEART RATE: 102 BPM | TEMPERATURE: 96.4 F | BODY MASS INDEX: 42.74 KG/M2

## 2024-04-24 DIAGNOSIS — R91.1 PULMONARY NODULE: ICD-10-CM

## 2024-04-24 DIAGNOSIS — R93.89 ABNORMAL CT OF THE CHEST: Primary | ICD-10-CM

## 2024-04-24 PROCEDURE — 99214 OFFICE O/P EST MOD 30 MIN: CPT | Performed by: INTERNAL MEDICINE

## 2024-04-24 PROCEDURE — G2211 COMPLEX E/M VISIT ADD ON: HCPCS | Performed by: INTERNAL MEDICINE

## 2024-04-24 NOTE — PROGRESS NOTES
"Pulmonary Follow Up Note  Javier Conley 68 y.o. male MRN: [id number]  4/24/2024      HPI:    Patient continues to have shortness of breath with exertional activities.  He is currently smoking 5 cigarettes daily.  No significant cough or sputum production.  No weight loss night sweats or chills.    Exercise Tolerance: Poor.  Able to ambulate approximately 200 feet prior to feeling short of breath       Meds:  Chantix    ROS:  Constitutional: - Fatigue, - chills, - fever, - weight change.   HEENT: - rhinorrhea, - sneezing, - sore throat.    Respiratory: - cough, + exertional shortness of breath, - wheezing.    Cardiovascular: - chest pain,  -palpitations, - leg swelling.   Gastrointestinal: - abdominal pain, - constipation, - diarrhea, - nausea, - vomiting.   Endocrine: - cold intolerance, - heat intolerance.   Genitourinary: - dysuria.   Musculoskeletal: + Diffuse arthralgias.   Skin:- rash, - wound.   Allergic/Immunologic: - allergies  Neurological: - dizziness, - numbness        Vitals: Blood pressure 128/80, pulse 102, temperature (!) 96.4 °F (35.8 °C), temperature source Tympanic, height 5' 8\" (1.727 m), weight 128 kg (282 lb), SpO2 94%., Body mass index is 42.88 kg/m².    Physical Exam:  GEN  NAD  HEENT  ncat, non icteric, MM moist  NECK  supple, no JVD, no LAD  CV  +s1s2, no mrg, RRR  PULM diminished breath sound diffusely, otherwise CTA BL, no wrr  ABD  soft, nt, + obese, + BS  EXT 1+ lower extremity pitting edema, no cyanosis, no clubbing  NEURO  Aox3, no focal weakness    Imaging and other studies:   I personally viewed and interpreted the following imaging studies:  CT chest 4/5/2024 shows severe diffuse bullous emphysema with enlarging right upper lobe pulmonary nodule that is now 1 cm in size and the largest diameter.    Pulmonary function testing:   PFT 4/5/2024 shows mild obstructive defect, air trapping and normal diffusing capacity    Assessment:  CT chest abnormality  Lung " nodule-enlarging  Emphysema  COPD, Gold class IB  Tobacco abuse  Tobacco cessation counseling    Plan:  CT chest shows enlarging pulmonary nodule now up to 1 cm in greatest diameter.  Ordered PET scan for evaluation of pulmonary nodule  PFT shows mild obstructive defect.  Patient again counseled on the importance of tobacco cessation particularly with an enlarging pulmonary nodule.  He is more motivated to quit and states that he will quit within the next 2 weeks.  He will continue use Chantix.  Answers submitted by the patient for this visit:  Pulmonology Questionnaire (Submitted on 4/17/2024)  Chief Complaint: Primary symptoms  Do you have difficulty breathing?: Yes  Do you have shortness of breath?: Yes  Chronicity: new  When did you first notice your symptoms?: more than 1 year ago  How often do your symptoms occur?: 2 to 4 times per day  Since you first noticed this problem, how has it changed?: unchanged  Have you had a change in appetite?: No  Do you have chest pain?: Yes  Do you have shortness of breath that occurs with effort or exertion?: Yes  Do you have ear congestion?: No  Do you have ear pain?: No  Do you have a fever?: No  Do you have headaches?: No  Do you have heartburn?: No  Do you have fatigue?: No  Do you have muscle pain?: Yes  Do you have nasal congestion?: No  Do you have shortness of breath when lying flat?: No  Do you have shortness of breath when you wake up?: Yes  Do you have post-nasal drip?: No  Do you have a runny nose?: No  Do you have sneezing?: No  Do you have a sore throat?: No  Do you have sweats?: Yes  Do you have trouble swallowing?: No  Have you experienced weight loss?: No  Which of the following makes your symptoms worse?: any activity, climbing stairs, exercise, exposure to smoke  Which of the following makes your symptoms better?: nothing  Risk factors for lung disease: smoking/tobacco exposure      Return visit in 2 to 3 weeks (after PET scan)  On next visit we will  "evaluate symptoms if any, PET/CT results    Note: Portions of the record may have been created with voice recognition software. Occasional wrong word or \"sound a like\" substitutions may have occurred due to the inherent limitations of voice recognition software. Read the chart carefully and recognize, using context, where substitutions have occurred.     Kaiser Tran M.D.  [facility]    "

## 2024-05-03 ENCOUNTER — HOSPITAL ENCOUNTER (OUTPATIENT)
Dept: CT IMAGING | Facility: HOSPITAL | Age: 69
Discharge: HOME | End: 2024-05-03
Payer: COMMERCIAL

## 2024-05-03 ENCOUNTER — HOSPITAL ENCOUNTER (OUTPATIENT)
Dept: NON INVASIVE DIAGNOSTICS | Facility: CLINIC | Age: 69
Discharge: HOME | End: 2024-05-03
Payer: COMMERCIAL

## 2024-05-03 VITALS
HEIGHT: 68 IN | SYSTOLIC BLOOD PRESSURE: 128 MMHG | WEIGHT: 282 LBS | BODY MASS INDEX: 42.74 KG/M2 | DIASTOLIC BLOOD PRESSURE: 80 MMHG | HEART RATE: 90 BPM

## 2024-05-03 DIAGNOSIS — I71.21 ANEURYSM OF ASCENDING AORTA WITHOUT RUPTURE (HCC): ICD-10-CM

## 2024-05-03 DIAGNOSIS — R59.0 LAD (LYMPHADENOPATHY) OF LEFT CERVICAL REGION: ICD-10-CM

## 2024-05-03 DIAGNOSIS — E78.5 HYPERLIPIDEMIA, UNSPECIFIED HYPERLIPIDEMIA TYPE: ICD-10-CM

## 2024-05-03 LAB
AORTIC ROOT: 3.8 CM
AORTIC VALVE MEAN VELOCITY: 14.6 M/S
APICAL FOUR CHAMBER EJECTION FRACTION: 62 %
ASCENDING AORTA: 4.1 CM
AV AREA BY CONTINUOUS VTI: 2.3 CM2
AV AREA PEAK VELOCITY: 2.1 CM2
AV LVOT MEAN GRADIENT: 4 MMHG
AV LVOT PEAK GRADIENT: 7 MMHG
AV MEAN GRADIENT: 10 MMHG
AV PEAK GRADIENT: 19 MMHG
AV REGURGITATION PRESSURE HALF TIME: 458 MS
AV VALVE AREA: 2.27 CM2
AV VELOCITY RATIO: 0.62
BSA FOR ECHO PROCEDURE: 2.37 M2
DOP CALC AO PEAK VEL: 2.18 M/S
DOP CALC AO VTI: 44.04 CM
DOP CALC LVOT AREA: 3.46 CM2
DOP CALC LVOT CARDIAC INDEX: 2.95 L/MIN/M2
DOP CALC LVOT CARDIAC OUTPUT: 7 L/MIN
DOP CALC LVOT DIAMETER: 2.1 CM
DOP CALC LVOT PEAK VEL VTI: 28.82 CM
DOP CALC LVOT PEAK VEL: 1.35 M/S
DOP CALC LVOT STROKE INDEX: 40.9 ML/M2
DOP CALC LVOT STROKE VOLUME: 99.77
E WAVE DECELERATION TIME: 230 MS
E/A RATIO: 0.79
FRACTIONAL SHORTENING: 26 (ref 28–44)
INTERVENTRICULAR SEPTUM IN DIASTOLE (PARASTERNAL SHORT AXIS VIEW): 1.3 CM
INTERVENTRICULAR SEPTUM: 1.3 CM (ref 0.6–1.1)
LAAS-AP2: 27.1 CM2
LAAS-AP4: 16.3 CM2
LEFT ATRIUM SIZE: 4.2 CM
LEFT ATRIUM VOLUME (MOD BIPLANE): 67 ML
LEFT ATRIUM VOLUME INDEX (MOD BIPLANE): 28.3 ML/M2
LEFT INTERNAL DIMENSION IN SYSTOLE: 3.4 CM (ref 2.1–4)
LEFT VENTRICULAR INTERNAL DIMENSION IN DIASTOLE: 4.6 CM (ref 3.5–6)
LEFT VENTRICULAR POSTERIOR WALL IN END DIASTOLE: 1.3 CM
LEFT VENTRICULAR STROKE VOLUME: 46 ML
LVSV (TEICH): 46 ML
MV E'TISSUE VEL-SEP: 6 CM/S
MV PEAK A VEL: 0.96 M/S
MV PEAK E VEL: 76 CM/S
MV STENOSIS PRESSURE HALF TIME: 67 MS
MV VALVE AREA P 1/2 METHOD: 3.28
RIGHT ATRIUM AREA SYSTOLE A4C: 14.3 CM2
RIGHT VENTRICLE ID DIMENSION: 3.7 CM
SINOTUBULAR JUNCTION: 3.6 CM
SL CV AV DECELERATION TIME RETROGRADE: 1578 MS
SL CV AV PEAK GRADIENT RETROGRADE: 93 MMHG
SL CV LEFT ATRIUM LENGTH A2C: 5.9 CM
SL CV LV EF: 65
SL CV PED ECHO LEFT VENTRICLE DIASTOLIC VOLUME (MOD BIPLANE) 2D: 95 ML
SL CV PED ECHO LEFT VENTRICLE SYSTOLIC VOLUME (MOD BIPLANE) 2D: 48 ML
SL CV SINUS OF VALSALVA 2D: 4.1 CM
STJ: 3.6 CM
TR MAX PG: 25 MMHG
TR PEAK VELOCITY: 2.5 M/S
TRICUSPID ANNULAR PLANE SYSTOLIC EXCURSION: 2.3 CM
TRICUSPID VALVE PEAK REGURGITATION VELOCITY: 2.51 M/S

## 2024-05-03 PROCEDURE — 70490 CT SOFT TISSUE NECK W/O DYE: CPT

## 2024-05-03 PROCEDURE — 93306 TTE W/DOPPLER COMPLETE: CPT

## 2024-05-03 PROCEDURE — 93306 TTE W/DOPPLER COMPLETE: CPT | Performed by: INTERNAL MEDICINE

## 2024-05-04 RX ORDER — ATORVASTATIN CALCIUM 80 MG/1
80 TABLET, FILM COATED ORAL DAILY
Qty: 90 TABLET | Refills: 1 | Status: SHIPPED | OUTPATIENT
Start: 2024-05-04

## 2024-05-06 ENCOUNTER — HOSPITAL ENCOUNTER (OUTPATIENT)
Dept: RADIOLOGY | Age: 69
Discharge: HOME | End: 2024-05-06
Payer: COMMERCIAL

## 2024-05-06 DIAGNOSIS — R91.1 PULMONARY NODULE: ICD-10-CM

## 2024-05-06 DIAGNOSIS — R93.89 ABNORMAL CT OF THE CHEST: ICD-10-CM

## 2024-05-06 DIAGNOSIS — D38.1 NEOPLASM OF UNCERTAIN BEHAVIOR OF RIGHT UPPER LOBE OF LUNG: ICD-10-CM

## 2024-05-06 LAB — GLUCOSE SERPL-MCNC: 123 MG/DL (ref 65–140)

## 2024-05-06 PROCEDURE — 82948 REAGENT STRIP/BLOOD GLUCOSE: CPT

## 2024-05-06 PROCEDURE — 78815 PET IMAGE W/CT SKULL-THIGH: CPT

## 2024-05-06 PROCEDURE — A9552 F18 FDG: HCPCS

## 2024-05-06 NOTE — LETTER
[facility]  [address] [place] [address]      May 9, 2024    MRN: [id number]     Phone: [phone]     Dear Mr. Conley,    You recently had a(n) Nuclear Medicine performed on 5/6/2024 at  [facility] that was requested by Kaiser Tran MD. The study was reviewed by a radiologist, which is a physician who specializes in medical imaging. The radiologist issued a report describing his or her findings. In that report there was a finding that the radiologist felt warranted further discussion with your health care provider and that discussion would be beneficial to you.      The results were sent to Kaiser Tran MD on 05/06/2024  2:41 PM. We recommend that you contact Kaiser Tran MD at [phone] or set up an appointment to discuss the results of the imaging test. If you have already heard from Kaiser Tran MD regarding the results of your study, you can disregard this letter.     This letter is not meant to alarm you, but intended to encourage you to follow-up on your results with the provider that sent you for the imaging study. In addition, we have enclosed answers to frequently asked questions by other patients who have also received a letter to review results with their health care provider (see page two).      Thank you for choosing [facility] for your medical imaging needs.                                                                                                                                                        FREQUENTLY ASKED QUESTIONS    Why am I receiving this letter?  [place] State Law requires us to notify patients who have findings on imaging exams that may require more testing or follow-up with a health professional within the next 3 months.        How serious is the finding on the imaging test?  This letter is sent to all patients who may need follow-up or more testing within the next 3 months.   Receiving this letter does not necessarily mean you have a life-threatening imaging finding or disease.  Recommendations in the radiologist’s imaging report are general in nature and it is up to your healthcare provider to say whether those recommendations make sense for your situation.  You are strongly encouraged to talk to your health care provider about the results and ask whether additional steps need to be taken.    Where can I get a copy of the final report for my recent radiology exam?  To get a full copy of the report you can access your records online at [email or web address] or please contact [facility]’s Medical Records Department at [phone] Monday through Friday between 8 am and 6 pm.         What do I need to do now?           Please contact your health care provider who requested the imaging study to discuss what further actions (if any) are needed.  You may have already heard from (your ordering provider) in regard to this test in which case you can disregard this letter.        NOTICE IN ACCORDANCE WITH THE [place] STATE “PATIENT TEST RESULT INFORMATION ACT OF 2018”    You are receiving this notice as a result of a determination by your diagnostic imaging service that further discussions of your test results are warranted and would be beneficial to you.    The complete results of your test or tests have been or will be sent to the health care practitioner that ordered the test or tests. It is recommended that you contact your health care practitioner to discuss your results as soon as possible.

## 2024-05-08 ENCOUNTER — OFFICE VISIT (OUTPATIENT)
Dept: PULMONOLOGY | Facility: CLINIC | Age: 69
End: 2024-05-08
Payer: COMMERCIAL

## 2024-05-08 VITALS
SYSTOLIC BLOOD PRESSURE: 122 MMHG | OXYGEN SATURATION: 94 % | TEMPERATURE: 97.9 F | DIASTOLIC BLOOD PRESSURE: 78 MMHG | WEIGHT: 284 LBS | BODY MASS INDEX: 43.18 KG/M2 | HEART RATE: 94 BPM

## 2024-05-08 DIAGNOSIS — R91.1 PULMONARY NODULE: Primary | ICD-10-CM

## 2024-05-08 DIAGNOSIS — Z72.0 TOBACCO ABUSE: ICD-10-CM

## 2024-05-08 DIAGNOSIS — J44.9 CHRONIC OBSTRUCTIVE PULMONARY DISEASE, UNSPECIFIED COPD TYPE (HCC): ICD-10-CM

## 2024-05-08 DIAGNOSIS — Z71.6 TOBACCO ABUSE COUNSELING: ICD-10-CM

## 2024-05-08 DIAGNOSIS — R93.89 ABNORMAL CT OF THE CHEST: ICD-10-CM

## 2024-05-08 PROCEDURE — 99214 OFFICE O/P EST MOD 30 MIN: CPT | Performed by: INTERNAL MEDICINE

## 2024-05-08 PROCEDURE — G2211 COMPLEX E/M VISIT ADD ON: HCPCS | Performed by: INTERNAL MEDICINE

## 2024-05-08 NOTE — PROGRESS NOTES
Pulmonary Follow Up Note  Javier Conley 68 y.o. male MRN: [id number]  5/8/2024      HPI:    Patient continues to have shortness of breath with exertional activities.  He states that he is smoking approximately 10 cigarettes daily.  No significant cough or sputum production.  No night sweats, fevers or chills.       Meds:  No inhaled medications    ROS:  Constitutional: - Fatigue, - chills, - fever, - weight change.   HEENT: - rhinorrhea, - sneezing, - sore throat.    Respiratory: - cough, + exertional shortness of breath, - wheezing.    Cardiovascular: - chest pain,  -palpitations, - leg swelling.   Gastrointestinal: - abdominal pain, - constipation, - diarrhea, - nausea, - vomiting.   Endocrine: - cold intolerance, - heat intolerance.   Genitourinary: - dysuria.   Musculoskeletal: + Arthralgias.   Skin:- rash, - wound.   Allergic/Immunologic: - allergies  Neurological: - dizziness, - numbness        Vitals: Blood pressure 122/78, pulse 94, temperature 97.9 °F (36.6 °C), weight 129 kg (284 lb), SpO2 94%., Body mass index is 43.18 kg/m².    Physical Exam:  GEN  NAD  HEENT  ncat, non icteric, MM moist  NECK  supple, no JVD, no LAD  CV  +s1s2, no mrg, RRR  PULM Minich breath sound diffusely, otherwise CTA BL, no wrr  ABD  soft, nt, + obese, + BS  EXT + lower extremity pitting edema, no cyanosis, no clubbing  NEURO  Aox3, no focal weakness    Imaging and other studies:   I personally viewed and interpreted the following imaging studies:  PET/CT 5/6/2024 shows FDG avid right upper lobe nodule    Pulmonary function testing:   PFT 4/5/2024 shows:  Spirometry: mild obstructive defect without a significant bronchodilator response  Lung volumes: Air trapping indicated by elevated RV  Diffusing testing: Normal diffusing capacity  Flow volume loop: Obstructive lung disease    Assessment:  CT chest abnormality  Enlarging lung nodule-FDG avid  Emphysema  COPD, Gold class Ib  Tobacco abuse  Tobacco cessation  "counseling    Plan:  I am concerned that FDG avid nodule on PET scan represents malignancy.  I will present him on thoracic tumor board.  Soon as possible consultation with thoracic surgery for evaluation of lobectomy/partial lobectomy  Counseled for at least 12 minutes on the importance of smoking cessation.  Patient states that he is not used the Chantix yet despite being prescribed.  He will start using this today.  He appears more motivated to quit tobacco particularly with my suspicion for malignancy.    Return visit in 1 month  On next visit we will evaluate symptoms, recommendation from thoracic tumor board, thoracic surgery evaluation    Note: Portions of the record may have been created with voice recognition software. Occasional wrong word or \"sound a like\" substitutions may have occurred due to the inherent limitations of voice recognition software. Read the chart carefully and recognize, using context, where substitutions have occurred.     Kaiser Tran M.D.  [facility]  "

## 2024-05-09 ENCOUNTER — DOCUMENTATION (OUTPATIENT)
Dept: HEMATOLOGY ONCOLOGY | Facility: CLINIC | Age: 69
End: 2024-05-09

## 2024-05-09 ENCOUNTER — TELEPHONE (OUTPATIENT)
Dept: HEMATOLOGY ONCOLOGY | Facility: CLINIC | Age: 69
End: 2024-05-09

## 2024-05-09 NOTE — TELEPHONE ENCOUNTER
I called Javier in response to a referral that was received for patient to establish care with Thoracic Surgery.     Outreach was made to schedule a consultation.    I left a voicemail explaining the reason for my call and advised patient to call [facility] at [phone].  Another attempt will be made to contact patient.    Schedule within: 5-7 days

## 2024-05-09 NOTE — TELEPHONE ENCOUNTER
Javier called in response to a referral that was received for patient to establish care with Thoracic Surgery.     Outreach was made to schedule a consultation.    A consultation was scheduled for patient during this call. Patient is scheduled on 5/13/24 at 11:00 AM with Dr Hirsch at the [facility]

## 2024-05-09 NOTE — PROGRESS NOTES
What is the reason for Thoracic Surgery visit? Pulmonary nodule    Does the patient qualify for an urgent new patient visit?  No    Schedule within: 5-7 days     Other scheduling guidelines or patient instructions:  N/A      Intake received/ Chart reviewed for work up completed     Imaging completed: 5/6/24 PET/CT, 5/3/24 CT soft tissue neck, 1/4/24 CT lung screening    Pathology completed:  N/A    All records needed are in patients chart. No records retrieval needed at this time.    Routing to [facility] for scheduling of:  -Thoracic Surgery consult within 5-7 days

## 2024-05-10 ENCOUNTER — TELEPHONE (OUTPATIENT)
Dept: FAMILY MEDICINE CLINIC | Facility: CLINIC | Age: 69
End: 2024-05-10

## 2024-05-10 DIAGNOSIS — K11.8 MASS OF LEFT PAROTID GLAND: Primary | ICD-10-CM

## 2024-05-10 DIAGNOSIS — I10 PRIMARY HYPERTENSION: ICD-10-CM

## 2024-05-10 RX ORDER — OLMESARTAN MEDOXOMIL AND HYDROCHLOROTHIAZIDE 20/12.5 20; 12.5 MG/1; MG/1
1 TABLET ORAL DAILY
Qty: 90 TABLET | Refills: 1 | Status: SHIPPED | OUTPATIENT
Start: 2024-05-10

## 2024-05-10 NOTE — TELEPHONE ENCOUNTER
Left   message  a bout  ct  scan  of  neck .  Left  parotid  gland   enlarged.  Needs  to  see  ENT.  Referral  placed

## 2024-05-13 ENCOUNTER — OFFICE VISIT (OUTPATIENT)
Dept: CARDIAC SURGERY | Facility: CLINIC | Age: 69
End: 2024-05-13
Payer: COMMERCIAL

## 2024-05-13 ENCOUNTER — TELEPHONE (OUTPATIENT)
Age: 69
End: 2024-05-13

## 2024-05-13 VITALS
OXYGEN SATURATION: 100 % | DIASTOLIC BLOOD PRESSURE: 78 MMHG | HEIGHT: 68 IN | SYSTOLIC BLOOD PRESSURE: 125 MMHG | BODY MASS INDEX: 42.87 KG/M2 | RESPIRATION RATE: 15 BRPM | WEIGHT: 282.85 LBS | HEART RATE: 91 BPM | TEMPERATURE: 98 F

## 2024-05-13 DIAGNOSIS — R07.9 EXERTIONAL CHEST PAIN: ICD-10-CM

## 2024-05-13 DIAGNOSIS — R91.1 PULMONARY NODULE: ICD-10-CM

## 2024-05-13 DIAGNOSIS — Z72.0 TOBACCO ABUSE: Primary | ICD-10-CM

## 2024-05-13 PROCEDURE — 99205 OFFICE O/P NEW HI 60 MIN: CPT | Performed by: THORACIC SURGERY (CARDIOTHORACIC VASCULAR SURGERY)

## 2024-05-13 NOTE — PROGRESS NOTES
Thoracic Consult  Assessment/Plan:    Tobacco abuse  We discussed that the patient will need to quit smoking prior to any type of surgical intervention for at least 3 weeks prior to the procedure.    Exertional chest pain  Since the patient is considering a surgical intervention, I would like the patient to return to his cardiologist for evaluation for a stress test in order to appropriately cardiac risk stratified.    Pulmonary nodule  Mr. Conley is a very pleasant 68-year-old male who presents to my office with an enlarging right lower lobe perifissural nodule.  This is in the setting of a heavy smoking history.  I have personally reviewed his CT scan in PACS this demonstrates an approximately 1 cm cavitated right upper lobe pulmonary nodule.    Today in the office, we had a long conversation via the  phone.  We used the  with identification number [id number].  We discussed the risks and benefits associated with obtaining a diagnosis of this nodule.  We discussed that I believe that an IR biopsy would be a difficult biopsy to perform since this is such a small lesion and it has a cavitation.  I believe that he would be at high risk for a missed biopsy.  We discussed proceeding straight to the operating room for a wedge resection with completion lobectomy.  I did explain to him that based upon his pulmonary function testing I do believe that he would tolerate this procedure without difficulty.  We also discussed watchful waiting.  I did explain that we have already done watchful waiting and I believe that his nodule will just continue to grow over the next 3 months.  He discussed the risks and benefits with his family and feels that obtaining a repeat CT scan in 3 months is his preferred approach.  I did again reiterate that I felt that a wedge biopsy with possible completion lobectomy will likely be the end result since I believe that this nodule will just continue to grow over the next 3 months  and the patient understands and agrees with the plan.  I have ordered a CT scan of the chest for 3 months from now.  Additionally, I would like him to see his cardiologist in the interim to obtain appropriate cardiac restratification in case we do end up in the operating room for procedure.  The patient understands and he will come back and see me.    Shaista Hirsch MD  Thoracic Surgery  (Available on [employer] Text)  Office: [phone]       Diagnoses and all orders for this visit:    Tobacco abuse    Pulmonary nodule  -     Ambulatory Referral to Thoracic Surgery  -     CT chest wo contrast; Future    Exertional chest pain          Thoracic History   Diagnosis:    Procedures/Surgeries:    Pathology:    Adjuvant Therapy:       Subjective:    Patient ID: Javier Conley is a 68 y.o. male.    HPI  Mr. Conley is a very pleasant 68-year-old male who presents to my office with an enlarging right lower lobe perifissural nodule.  This is in the setting of a heavy smoking history.  I have personally reviewed his CT scan in PACS this demonstrates an approximately 1 cm cavitated right upper lobe pulmonary nodule.  I also personally reviewed his PET CT scan in PACS which demonstrates mild abnormal avidity within this nodule.    Today in the office, the patient reports that he is in an overall okay state of health.  He does report dyspnea on exertion.  He also reported exertional chest pain to me in the office today.  He reports that he is an active smoker, but he is trying to quit.  He smokes approximately a pack per day for the past 60 years.  He denies a chronic cough.  He denies any recent upper respiratory infection or pneumonia.    I have personally reviewed all of the notes in the chart including from his pulmonologist Dr. Tran.    The following portions of the patient's history were reviewed and updated as appropriate: allergies, current medications, past family history, past medical history, past social  history, past surgical history and problem list.    Past Medical History:   Diagnosis Date    Class 2 obesity     COPD (chronic obstructive pulmonary disease) with emphysema (HCC)     Coronary artery disease     Heavy smoker     Hyperlipidemia     Hypertension     Sarcoidosis       Past Surgical History:   Procedure Laterality Date    COLONOSCOPY  01/18/2024      Family History   Problem Relation Age of Onset    Hypertension Mother     Stomach cancer Sister       Social History     Socioeconomic History    Marital status: /Civil Union     Spouse name: Not on file    Number of children: Not on file    Years of education: Not on file    Highest education level: Not on file   Occupational History    Not on file   Tobacco Use    Smoking status: Every Day     Current packs/day: 1.00     Average packs/day: 1 pack/day for 50.0 years (50.0 ttl pk-yrs)     Types: Cigarettes     Passive exposure: Past    Smokeless tobacco: Never   Vaping Use    Vaping status: Never Used   Substance and Sexual Activity    Alcohol use: Yes     Alcohol/week: 5.0 standard drinks of alcohol     Types: 5 Cans of beer per week     Comment: socially    Drug use: Never    Sexual activity: Not on file   Other Topics Concern    Not on file   Social History Narrative    Not on file     Social Determinants of Health     Financial Resource Strain: Low Risk  (11/21/2023)    Overall Financial Resource Strain (CARDIA)     Difficulty of Paying Living Expenses: Not hard at all   Food Insecurity: Not on file   Transportation Needs: No Transportation Needs (11/21/2023)    PRAPARE - Transportation     Lack of Transportation (Medical): No     Lack of Transportation (Non-Medical): No   Physical Activity: Not on file   Stress: Not on file   Social Connections: Not on file   Intimate Partner Violence: Not on file   Housing Stability: Not on file      Review of Systems   Constitutional: Negative.  Negative for activity change, chills, fatigue, fever and  unexpected weight change.   HENT:  Negative for sore throat, trouble swallowing and voice change.    Eyes: Negative.  Negative for visual disturbance.   Respiratory:  Positive for shortness of breath. Negative for cough, chest tightness, wheezing and stridor.    Cardiovascular:  Positive for chest pain. Negative for leg swelling.   Gastrointestinal: Negative.    Endocrine: Negative.    Genitourinary: Negative.    Musculoskeletal: Negative.    Skin: Negative.    Allergic/Immunologic: Negative.    Neurological:  Negative for seizures, syncope, speech difficulty, weakness, light-headedness and headaches.   Hematological:  Negative for adenopathy.   Psychiatric/Behavioral: Negative.           Objective:   Physical Exam  Vitals and nursing note reviewed.   Constitutional:       Appearance: He is well-developed. He is obese. He is not diaphoretic.   HENT:      Head: Normocephalic and atraumatic.      Nose: Nose normal. No congestion.      Mouth/Throat:      Mouth: Mucous membranes are moist.      Pharynx: Oropharynx is clear.   Eyes:      Extraocular Movements: Extraocular movements intact.      Pupils: Pupils are equal, round, and reactive to light.   Neck:      Trachea: No tracheal deviation.   Cardiovascular:      Rate and Rhythm: Normal rate and regular rhythm.      Heart sounds: Normal heart sounds. No murmur heard.  Pulmonary:      Effort: Pulmonary effort is normal. No respiratory distress.      Breath sounds: Normal breath sounds. No stridor. No wheezing or rales.   Chest:      Chest wall: No tenderness.   Abdominal:      General: Bowel sounds are normal. There is no distension.      Palpations: Abdomen is soft.      Tenderness: There is no abdominal tenderness.   Musculoskeletal:         General: Normal range of motion.      Cervical back: Normal range of motion.   Lymphadenopathy:      Cervical: No cervical adenopathy.   Skin:     General: Skin is warm and dry.      Coloration: Skin is not pale.      Findings:  "No erythema or rash.   Neurological:      Mental Status: He is alert and oriented to person, place, and time.   Psychiatric:         Behavior: Behavior normal.         Thought Content: Thought content normal.         Judgment: Judgment normal.     /78 (BP Location: Left arm, Patient Position: Sitting, Cuff Size: Large)   Pulse 91   Temp 98 °F (36.7 °C) (Temporal)   Resp 15   Ht 5' 8\" (1.727 m)   Wt 128 kg (282 lb 13.6 oz)   SpO2 100%   BMI 43.01 kg/m²     No Chest XR results available for this patient.   No CT Chest results available for this patient.  No CT Chest,Abdomen,Pelvis results available for this patient.   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 Multiplanar attenuation corrected and non attenuation corrected PET images are available for interpretation, and contiguous, low dose, axial CT sections were obtained from the skull base through the femurs. Intravenous contrast material was not utilized. This examination, like all CT scans performed in the [facility], was performed utilizing techniques to minimize radiation dose exposure, including the use of iterative reconstruction and automated exposure control. Fasting serum glucose: 123 mg/dl FINDINGS: VISUALIZED BRAIN: HEAD/NECK: .There is a dermal-based area of hypermetabolism in the left frontal scalp with maximal SUV of about 6.5. There is underlying rounded density measuring 1.7 cm without associated bony erosion or erosive changes, Indeterminate, may be an inflamed dermal inclusion cyst . A nodular area of increased uptake in the left neck with maximum 3.8, image " 63 in the region of the left level 2 lymph node, measuring 5 mm.. There is a left parotid lesion at its posterior aspect, measuring 2.2 cm with mild uptake of 2. 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 Measuring about 1.18 cm, this is at the borderline threshold for assessment with PET scan however there is sedation of increased uptake in this nodule on the acute clear images with SUV of 2.1, this is suspicious. This is seen in image 123 series 3 Emphysema seen nonfused scattered lung cysts CT images: Coronary artery calcification seen Ascending aorta measures 4.4 cm Lower right paratracheal lymph nodes are seen focal area of uptake seen in the lower left rib in the due to healing fracture ABDOMEN: No FDG avid soft tissue lesions are seen. CT images: Unremarkable. PELVIS: No FDG avid soft tissue lesions are seen. CT images: Unremarkable. OSSEOUS STRUCTURES: No FDG avid lesions are seen. CT images: No significant findings.     Impression Enlarging atypical lung cyst right upper lobe abutting the fissure, demonstrate mild uptake suspicious There is no PET evidence of distal metastatic disease 2.2 cm dermal-based lesion left frontal scalp with hyper metabolism indeterminate probably inflamed dermal occlusion cyst. However clinical correlation for definite characterization suggested. 2.2 cm left parotid lesion with mild uptake, needs further characterization 5 mm focus of increased FDG avidity left neck at the level of the level 2 lymph node due to small lymph node. The study was marked in EPIC for significant notification. Workstation performed: [id number]      No Barium Swallow results available for this patient.

## 2024-05-13 NOTE — ASSESSMENT & PLAN NOTE
We discussed that the patient will need to quit smoking prior to any type of surgical intervention for at least 3 weeks prior to the procedure.

## 2024-05-13 NOTE — ASSESSMENT & PLAN NOTE
Since the patient is considering a surgical intervention, I would like the patient to return to his cardiologist for evaluation for a stress test in order to appropriately cardiac risk stratified.

## 2024-05-13 NOTE — ASSESSMENT & PLAN NOTE
Mr. Conley is a very pleasant 68-year-old male who presents to my office with an enlarging right lower lobe perifissural nodule.  This is in the setting of a heavy smoking history.  I have personally reviewed his CT scan in PACS this demonstrates an approximately 1 cm cavitated right upper lobe pulmonary nodule.    Today in the office, we had a long conversation via the  phone.  We used the  with identification number [id number].  We discussed the risks and benefits associated with obtaining a diagnosis of this nodule.  We discussed that I believe that an IR biopsy would be a difficult biopsy to perform since this is such a small lesion and it has a cavitation.  I believe that he would be at high risk for a missed biopsy.  We discussed proceeding straight to the operating room for a wedge resection with completion lobectomy.  I did explain to him that based upon his pulmonary function testing I do believe that he would tolerate this procedure without difficulty.  We also discussed watchful waiting.  I did explain that we have already done watchful waiting and I believe that his nodule will just continue to grow over the next 3 months.  He discussed the risks and benefits with his family and feels that obtaining a repeat CT scan in 3 months is his preferred approach.  I did again reiterate that I felt that a wedge biopsy with possible completion lobectomy will likely be the end result since I believe that this nodule will just continue to grow over the next 3 months and the patient understands and agrees with the plan.  I have ordered a CT scan of the chest for 3 months from now.  Additionally, I would like him to see his cardiologist in the interim to obtain appropriate cardiac restratification in case we do end up in the operating room for procedure.  The patient understands and he will come back and see me.    Shaista Hirsch MD  Thoracic Surgery  (Available on [employer])  Office:  [phone]

## 2024-05-14 ENCOUNTER — TELEPHONE (OUTPATIENT)
Age: 69
End: 2024-05-14

## 2024-05-14 DIAGNOSIS — I10 PRIMARY HYPERTENSION: ICD-10-CM

## 2024-05-14 DIAGNOSIS — I25.10 CORONARY ARTERY DISEASE INVOLVING NATIVE CORONARY ARTERY OF NATIVE HEART WITHOUT ANGINA PECTORIS: ICD-10-CM

## 2024-05-14 NOTE — TELEPHONE ENCOUNTER
[facility] called asking if we could please fax them the patients recent testing results: Pet Scan 5/6/24, PFT 4/5/24, CT 4/5/24, and Office visit notes from 5/8/24. Please advise    [facility]  Phone #[phone]  Fax #[phone]

## 2024-05-15 RX ORDER — METOPROLOL SUCCINATE 50 MG/1
50 TABLET, EXTENDED RELEASE ORAL DAILY
Qty: 90 TABLET | Refills: 1 | Status: SHIPPED | OUTPATIENT
Start: 2024-05-15

## 2024-06-05 DIAGNOSIS — Z72.0 TOBACCO ABUSE: ICD-10-CM

## 2024-06-07 RX ORDER — VARENICLINE TARTRATE 0.5 MG/1
TABLET, FILM COATED ORAL
Qty: 151 TABLET | Refills: 0 | Status: SHIPPED | OUTPATIENT
Start: 2024-06-07 | End: 2024-07-18

## 2024-06-24 ENCOUNTER — TELEPHONE (OUTPATIENT)
Dept: CARDIAC SURGERY | Facility: CLINIC | Age: 69
End: 2024-06-24

## 2024-06-24 NOTE — TELEPHONE ENCOUNTER
Called patient in regards to an upcoming appointment he was unavailable at the time, but I spoke with patients daughter Katie. She verbally confirmed that the appointment time change was ok for upcoming appointment. August 20, 2024 @ 1015 am.    Thank you have a great day

## 2024-07-09 ENCOUNTER — TELEPHONE (OUTPATIENT)
Dept: CARDIAC SURGERY | Facility: CLINIC | Age: 69
End: 2024-07-09

## 2024-07-09 ENCOUNTER — OFFICE VISIT (OUTPATIENT)
Dept: PULMONOLOGY | Facility: CLINIC | Age: 69
End: 2024-07-09
Payer: COMMERCIAL

## 2024-07-09 VITALS
SYSTOLIC BLOOD PRESSURE: 126 MMHG | HEIGHT: 68 IN | BODY MASS INDEX: 44.25 KG/M2 | OXYGEN SATURATION: 97 % | HEART RATE: 95 BPM | WEIGHT: 292 LBS | DIASTOLIC BLOOD PRESSURE: 80 MMHG | TEMPERATURE: 97.6 F

## 2024-07-09 DIAGNOSIS — R91.1 PULMONARY NODULE: Primary | ICD-10-CM

## 2024-07-09 DIAGNOSIS — R93.89 ABNORMAL CT OF THE CHEST: ICD-10-CM

## 2024-07-09 DIAGNOSIS — Z87.891 HISTORY OF TOBACCO ABUSE: ICD-10-CM

## 2024-07-09 DIAGNOSIS — E66.01 CLASS 3 SEVERE OBESITY WITH SERIOUS COMORBIDITY AND BODY MASS INDEX (BMI) OF 40.0 TO 44.9 IN ADULT, UNSPECIFIED OBESITY TYPE (HCC): ICD-10-CM

## 2024-07-09 PROCEDURE — 99214 OFFICE O/P EST MOD 30 MIN: CPT | Performed by: INTERNAL MEDICINE

## 2024-07-09 PROCEDURE — G2211 COMPLEX E/M VISIT ADD ON: HCPCS | Performed by: INTERNAL MEDICINE

## 2024-07-09 NOTE — PROGRESS NOTES
"Pulmonary Follow Up Note  Javier Conley 68 y.o. male MRN: [id number]  7/9/2024      HPI:    Patient continues to deny pulmonary complains of shortness of breath cough or sputum production.  No fevers or chills.  He has had an unintentional weight gain since quitting tobacco.     Meds:  No inhaled medications    ROS:  Constitutional: - Fatigue, - chills, - fever, - weight change.   HEENT: - rhinorrhea, - sneezing, - sore throat.    Respiratory: - cough, - shortness of breath, - wheezing.    Cardiovascular: - chest pain,  -palpitations, - leg swelling.   Gastrointestinal: - abdominal pain, - constipation, - diarrhea, - nausea, - vomiting.   Endocrine: - cold intolerance, - heat intolerance.   Genitourinary: - dysuria.   Musculoskeletal: - arthralgias.   Skin:- rash, - wound.   Allergic/Immunologic: - allergies  Neurological: - dizziness, - numbness        Vitals: Blood pressure 126/80, pulse 95, temperature 97.6 °F (36.4 °C), temperature source Temporal, height 5' 8\" (1.727 m), weight 132 kg (292 lb), SpO2 97%., Body mass index is 44.4 kg/m².    Physical Exam:  GEN  NAD  HEENT  ncat, non icteric, MM moist  NECK  supple, no JVD, no LAD  CV  +s1s2, no mrg, RRR  PULM diminished breath sounds particular in both apices, otherwise CTA BL, no wrr  ABD  soft, ntnd, + BS  EXT  no edema, no cyanosis, no clubbing  NEURO  Aox3, no focal weakness    Imaging and other studies:   I personally viewed and interpreted the following imaging studies:  PET/CT 5 6 shows FDG avid right upper lobe nodule    Pulmonary function testing:   PFT 4/5/2024 shows mild obstructive defect without a significant bronchodilator response, air trapping, normal diffusing capacity    Assessment:  CT chest abnormality  Enlarging lung nodule-FDG avid  Emphysema  COPD-Gold class Ib  History of tobacco abuse     Plan:  I extensively discussed that FDG avid pulmonary nodule on PET scan still represents malignancy.  He states that he will follow-up with " "thoracic surgery at an outside institution this August.  Patient congratulated on smoking cessation.  Patient has been tobacco free for approximately 1 month.  Patient has discussed PET/CT abnormality with cardiothoracic surgery.  Recommendation was for wide resection with possible completion lobectomy.  Patient has elected to continue watchful waiting with a repeat CT chest and forego definitive treatment at this time.  He has reevaluation and another appointment scheduled for September 2024.    Return visit in October 2024  On next visit we will evaluate symptoms, discussed patient's decision regarding thoracic surgery    Note: Portions of the record may have been created with voice recognition software. Occasional wrong word or \"sound a like\" substitutions may have occurred due to the inherent limitations of voice recognition software. Read the chart carefully and recognize, using context, where substitutions have occurred.     Kaiser Tran M.D.  [facility]    Answers submitted by the patient for this visit:  Pulmonology Questionnaire (Submitted on 7/9/2024)  Chief Complaint: Primary symptoms  Have you had a change in appetite?: No  Do you have chest pain?: No  Do you have shortness of breath that occurs with effort or exertion?: Yes  Do you have ear congestion?: No  Do you have ear pain?: No  Do you have a fever?: No  Do you have headaches?: No  Do you have heartburn?: No  Do you have fatigue?: No  Do you have muscle pain?: Yes  Do you have nasal congestion?: No  Do you have shortness of breath when lying flat?: No  Do you have shortness of breath when you wake up?: Yes  Do you have post-nasal drip?: No  Do you have a runny nose?: No  Do you have sneezing?: No  Do you have a sore throat?: No  Do you have sweats?: Yes  Do you have trouble swallowing?: No  Have you experienced weight loss?: No    "

## 2024-07-09 NOTE — TELEPHONE ENCOUNTER
Called patient to get him rescheduled for his appointment. Patient verbally was ok with the change of his appointment which is scheduled for September 17, 2024 @ 11am

## 2024-07-30 ENCOUNTER — OFFICE VISIT (OUTPATIENT)
Dept: CARDIOLOGY CLINIC | Facility: MEDICAL CENTER | Age: 69
End: 2024-07-30
Payer: COMMERCIAL

## 2024-07-30 VITALS
SYSTOLIC BLOOD PRESSURE: 128 MMHG | HEIGHT: 68 IN | WEIGHT: 293 LBS | HEART RATE: 81 BPM | DIASTOLIC BLOOD PRESSURE: 80 MMHG | BODY MASS INDEX: 44.41 KG/M2 | OXYGEN SATURATION: 94 %

## 2024-07-30 DIAGNOSIS — I10 PRIMARY HYPERTENSION: ICD-10-CM

## 2024-07-30 DIAGNOSIS — E78.5 HYPERLIPIDEMIA, UNSPECIFIED HYPERLIPIDEMIA TYPE: ICD-10-CM

## 2024-07-30 DIAGNOSIS — I71.21 ANEURYSM OF ASCENDING AORTA WITHOUT RUPTURE (HCC): ICD-10-CM

## 2024-07-30 DIAGNOSIS — Z87.891 HISTORY OF TOBACCO ABUSE: ICD-10-CM

## 2024-07-30 DIAGNOSIS — R06.09 DOE (DYSPNEA ON EXERTION): ICD-10-CM

## 2024-07-30 DIAGNOSIS — R07.9 CHEST PAIN, UNSPECIFIED TYPE: ICD-10-CM

## 2024-07-30 DIAGNOSIS — I35.1 AORTIC VALVE INSUFFICIENCY, ETIOLOGY OF CARDIAC VALVE DISEASE UNSPECIFIED: ICD-10-CM

## 2024-07-30 DIAGNOSIS — I25.10 CORONARY ARTERY DISEASE INVOLVING NATIVE CORONARY ARTERY OF NATIVE HEART WITHOUT ANGINA PECTORIS: Primary | ICD-10-CM

## 2024-07-30 PROCEDURE — 1159F MED LIST DOCD IN RCRD: CPT | Performed by: INTERNAL MEDICINE

## 2024-07-30 PROCEDURE — 99214 OFFICE O/P EST MOD 30 MIN: CPT | Performed by: INTERNAL MEDICINE

## 2024-07-30 PROCEDURE — 1160F RVW MEDS BY RX/DR IN RCRD: CPT | Performed by: INTERNAL MEDICINE

## 2024-07-30 NOTE — PROGRESS NOTES
Cardiology Follow Up    Javier Conley  1955  [id number]  [facility] [place]  [address]  [address]  [facility] [id number] [phone]    1. Coronary artery disease involving native coronary artery of native heart without angina pectoris        2. Chest pain, unspecified type        3. LENTZ (dyspnea on exertion)        4. Primary hypertension        5. Aneurysm of ascending aorta without rupture (HCC)        6. Aortic valve insufficiency, etiology of cardiac valve disease unspecified        7. Hyperlipidemia, unspecified hyperlipidemia type            Diagnoses and all orders for this visit:    Coronary artery disease involving native coronary artery of native heart without angina pectoris    Chest pain, unspecified type    LENTZ (dyspnea on exertion)    Primary hypertension    Aneurysm of ascending aorta without rupture (HCC)    Aortic valve insufficiency, etiology of cardiac valve disease unspecified    Hyperlipidemia, unspecified hyperlipidemia type      I had the pleasure of seeing Javier Conley for a follow up visit.     INTERVAL HISTORY: none    History of the presenting illness, Discussion/Summary and My Plan are as follows:::    Javier is a pleasant 68-year-old gentleman who speaks mostly Polish.  He has a history of coronary disease, balloon angioplasty-ramus and diagonal 1, distal left main stenosis which was deemed not hemodynamically significant at that time-all in 2019-I suspect in the setting of chest pains.     He also has a history of hypertension, dyslipidemia, remote DVT-around her prior to 2014, tobacco use-(quit in June 2024), a lung nodule under surveillance and mild to moderate AI on echocardiogram report from 2019 - moderate in 2024.     Jan 2024 -  CT scan of his lungs showed a 4.7 cm fusiform aortic aneurysm and is due for serial follow ups     He is not acutely symptomatic from a  cardiac standpoint, is moderately active.  He has had shronic shortness of breath with exertion which she feels is very stable.  His BMI is 45.  He also has chronic random chest tightness that lasts about 10 to 15 minutes without associated sweating or shortness of breath, mostly with increased stress or sometimes with activity (leaning low, mowing the grass)or relieving factors that has been stable for over an year and a half.     In the past he had followed at [place] with Dr. Judd and presents to establish care since he lives in [place].     History was obtained through  services through an iPad.     Plan:      Coronary artery disease: See  anatomy below.   Now with CP with typical and atypical features.  At the last visit -  Offered stress testing in the setting of his chest pains although these have been stable and chronic, he pleasantly declined, his wife declined as well.  This time he would be willing to do it - order Ex nuc stress test  He feels his shortness of breath has been stable.  Speaks Polish only - history was obtained through  services through an iPad.     Mild to moderate AI on last echo-2019-2024: stable on echo-no further eval this visit     Aortic aneurysm: At 4.7 cm (Jan 2024), indexed to his body surface area of 2.5-1.9 cm/meters squared which is dilated, he has serial follow-up CT lung studies which will be CCed to me as well to assess for increase in size.    It was reported at 4.4 cm in May 2024     (It appears to have been stable in size, reported as 4.6 x 5 in 2019 on CT scan report on Care Everywhere)  He is 6 feet tall  On echo Ao root at 3.8 and ascending aorta at 4.1 cm  Recheck non contrast CT in 2025 (might be undergoing lung ca surveillance anyway)     Hypertension: Controlled, high salt intake     Dyslipidemia: On a statin and controlled     Follow-up in 6 months     Latest Reference Range & Units 11/21/23 14:14   Cholesterol See Comment mg/dL 115    Triglycerides See Comment mg/dL 127   HDL >=40 mg/dL 45   Non-HDL Cholesterol mg/dl 70   LDL Calculated 0 - 100 mg/dL 45      Latest Reference Range & Units 19 07:36 23 14:14   BUN 5 - 25 mg/dL 12 (E) 9   Creatinine 0.60 - 1.30 mg/dL 0.8 (E) 0.81   (E): External lab result   Latest Reference Range & Units 19 15:07   Hemoglobin A1C 4.0 - 5.6 % 5.9 (H) (E)   eAG, EST AVG Glucose <126  123 (E)   (H): Data is abnormally high  (E): External lab result  Patient Active Problem List   Diagnosis    Coronary artery disease involving native coronary artery of native heart without angina pectoris    Hyperlipidemia    Primary hypertension    Hx of skin cancer, basal cell    Obesity, morbid (HCC)    COPD (chronic obstructive pulmonary disease) (HCC)    Aneurysm of ascending aorta without rupture (HCC)    Aortic valve regurgitation    Exertional dyspnea    Exertional chest pain    History of tobacco abuse    Abnormal CT of the chest    Pulmonary nodule    Tobacco abuse counseling    Chest pain    LENTZ (dyspnea on exertion)     Past Medical History:   Diagnosis Date    Class 2 obesity     COPD (chronic obstructive pulmonary disease) with emphysema (HCC)     Coronary artery disease     Heavy smoker     Hyperlipidemia     Hypertension     Sarcoidosis      Social History     Socioeconomic History    Marital status: /Civil Union     Spouse name: Not on file    Number of children: Not on file    Years of education: Not on file    Highest education level: Not on file   Occupational History    Not on file   Tobacco Use    Smoking status: Former     Current packs/day: 0.00     Average packs/day: 1 pack/day for 50.0 years (50.0 ttl pk-yrs)     Types: Cigarettes     Quit date: 2024     Years since quittin.1     Passive exposure: Past    Smokeless tobacco: Never   Vaping Use    Vaping status: Never Used   Substance and Sexual Activity    Alcohol use: Yes     Alcohol/week: 5.0 standard drinks of alcohol      Types: 5 Cans of beer per week     Comment: socially    Drug use: Never    Sexual activity: Not on file   Other Topics Concern    Not on file   Social History Narrative    Not on file     Social Determinants of Health     Financial Resource Strain: Low Risk  (11/21/2023)    Overall Financial Resource Strain (CARDIA)     Difficulty of Paying Living Expenses: Not hard at all   Food Insecurity: Not on file   Transportation Needs: No Transportation Needs (11/21/2023)    PRAPARE - Transportation     Lack of Transportation (Medical): No     Lack of Transportation (Non-Medical): No   Physical Activity: Not on file   Stress: Not on file   Social Connections: Unknown (6/18/2024)    Received from [facility]     How often do you feel lonely or isolated from those around you? (Adult - for ages 18 years and over): Not on file   Intimate Partner Violence: Not on file   Housing Stability: Not on file      Family History   Problem Relation Age of Onset    Hypertension Mother     Stomach cancer Sister      Past Surgical History:   Procedure Laterality Date    COLONOSCOPY  01/18/2024       Current Outpatient Medications:     Aspirin 81 MG CAPS, Take 81 mg by mouth daily, Disp: , Rfl:     metoprolol succinate (TOPROL-XL) 50 mg 24 hr tablet, TAKE 1 TABLET BY MOUTH EVERY DAY, Disp: 90 tablet, Rfl: 1    naproxen (Naprosyn) 500 mg tablet, Take 1 tablet (500 mg total) by mouth 2 (two) times a day as needed for mild pain, Disp: 30 tablet, Rfl: 0    olmesartan-hydrochlorothiazide (BENICAR HCT) 20-12.5 MG per tablet, TAKE 1 TABLET BY MOUTH EVERY DAY, Disp: 90 tablet, Rfl: 1    atorvastatin (LIPITOR) 80 mg tablet, TAKE 1 TABLET BY MOUTH EVERY DAY (Patient not taking: Reported on 5/8/2024), Disp: 90 tablet, Rfl: 1    carbamide peroxide (DEBROX) 6.5 % otic solution, Administer 5 drops into both ears 2 (two) times a day for 14 days, Disp: 15 mL, Rfl: 0    varenicline (CHANTIX) 0.5 mg tablet, TAKE 1 TABLET (0.5 MG TOTAL) BY  "MOUTH DAILY FOR 3 DAYS, THEN 1 TABLET (0.5 MG TOTAL) 2 (TWO) TIMES A DAY FOR 4 DAYS, THEN 2 TABLETS (1 MG TOTAL) 2 (TWO) TIMES A DAY., Disp: 151 tablet, Rfl: 0  No Known Allergies    Imaging: No results found.    Review of Systems:  Review of Systems   Constitutional: Negative.    HENT: Negative.     Eyes: Negative.    Respiratory:  Positive for chest tightness. Negative for apnea, cough, choking, shortness of breath, wheezing and stridor.    Cardiovascular:  Positive for chest pain. Negative for palpitations and leg swelling.   Musculoskeletal: Negative.        Physical Exam:  /80 (BP Location: Left arm, Patient Position: Sitting, Cuff Size: Adult)   Pulse 81   Ht 5' 8\" (1.727 m)   Wt 133 kg (293 lb)   SpO2 94%   BMI 44.55 kg/m²   Physical Exam  Constitutional:       General: He is not in acute distress.     Appearance: He is not ill-appearing, toxic-appearing or diaphoretic.   HENT:      Head: Normocephalic and atraumatic.      Nose: Nose normal. No congestion or rhinorrhea.      Mouth/Throat:      Mouth: Mucous membranes are moist.      Pharynx: No oropharyngeal exudate or posterior oropharyngeal erythema.   Neck:      Vascular: No carotid bruit.   Cardiovascular:      Rate and Rhythm: Normal rate.      Heart sounds: No murmur heard.     No friction rub. No gallop.   Pulmonary:      Effort: Pulmonary effort is normal. No respiratory distress.      Breath sounds: No stridor. No wheezing or rhonchi.   Abdominal:      General: Abdomen is flat. There is no distension.      Palpations: Abdomen is soft. There is no mass.      Tenderness: There is no abdominal tenderness.      Hernia: No hernia is present.   Musculoskeletal:         General: No swelling, tenderness, deformity or signs of injury. Normal range of motion.      Cervical back: Normal range of motion. No rigidity or tenderness.   Lymphadenopathy:      Cervical: No cervical adenopathy.   Skin:     General: Skin is warm.      Coloration: Skin is not " "jaundiced or pale.      Findings: No bruising or erythema.   Neurological:      Mental Status: He is alert.         This note was completed in part utilizing [employer] direct voice recognition software.   Grammatical errors, random word insertion, spelling mistakes, occasional wrong word or \"sound-alike\" substitutions and incomplete sentences may be an occasional consequence of the system secondary to software limitations, ambient noise and hardware issues. At the time of dictation, efforts were made to edit, clarify and /or correct errors.  Please read the chart carefully and recognize, using context, where substitutions have occurred.  If you have any questions or concerns about the context, text or information contained within the body of this dictation, please contact myself, the provider, for further clarification.  "

## 2024-08-02 ENCOUNTER — TELEPHONE (OUTPATIENT)
Dept: CARDIAC SURGERY | Facility: CLINIC | Age: 69
End: 2024-08-02

## 2024-08-02 NOTE — TELEPHONE ENCOUNTER
Denise from the pods reach out to use regarding patient getting blood work done before his CTA on 08/08/2024. I let her know I will give patient information to his coordinator and that she will give him a call to remind him to get the blood work done before his test.

## 2024-08-05 ENCOUNTER — TELEPHONE (OUTPATIENT)
Dept: NON INVASIVE DIAGNOSTICS | Facility: HOSPITAL | Age: 69
End: 2024-08-05

## 2024-08-05 ENCOUNTER — TELEPHONE (OUTPATIENT)
Age: 69
End: 2024-08-05

## 2024-08-05 NOTE — TELEPHONE ENCOUNTER
Caller: Mariaa ( CT-SCAN) Department     Doctor: Dr. Stanley     Reason for call: Mariaa called & advised that pt is scheduled for a CT Chest, ABD Pelvis on 8/8 and would like to know if pt can be contacted and advised that he needs to get his blood work done prior to appointment.       Call back#:  Patient [phone]

## 2024-08-06 ENCOUNTER — HOSPITAL ENCOUNTER (OUTPATIENT)
Dept: NON INVASIVE DIAGNOSTICS | Facility: HOSPITAL | Age: 69
Discharge: HOME | End: 2024-08-06
Attending: INTERNAL MEDICINE

## 2024-08-12 ENCOUNTER — TELEPHONE (OUTPATIENT)
Age: 69
End: 2024-08-12

## 2024-08-12 NOTE — TELEPHONE ENCOUNTER
Left message for provider stating that Dr. Tran will reach out to her when he is back in office and that pt is scheduled for follow up with [facility] on the 20th.

## 2024-08-12 NOTE — TELEPHONE ENCOUNTER
[facility] Believes that the nodule is related to rheumatoid arthritis and if that changes plan for biopsy.       Can CB at [phone]

## 2024-08-13 ENCOUNTER — HOSPITAL ENCOUNTER (OUTPATIENT)
Dept: NON INVASIVE DIAGNOSTICS | Facility: HOSPITAL | Age: 69
Discharge: HOME | End: 2024-08-13
Attending: INTERNAL MEDICINE
Payer: COMMERCIAL

## 2024-08-13 ENCOUNTER — HOSPITAL ENCOUNTER (OUTPATIENT)
Facility: HOSPITAL | Age: 69
Discharge: HOME | End: 2024-08-13
Attending: INTERNAL MEDICINE
Payer: COMMERCIAL

## 2024-08-13 VITALS — HEIGHT: 68 IN | BODY MASS INDEX: 44.41 KG/M2 | WEIGHT: 293 LBS

## 2024-08-13 DIAGNOSIS — R07.9 CHEST PAIN, UNSPECIFIED TYPE: ICD-10-CM

## 2024-08-13 DIAGNOSIS — R06.09 DOE (DYSPNEA ON EXERTION): ICD-10-CM

## 2024-08-13 DIAGNOSIS — I25.10 CORONARY ARTERY DISEASE INVOLVING NATIVE CORONARY ARTERY OF NATIVE HEART WITHOUT ANGINA PECTORIS: ICD-10-CM

## 2024-08-13 LAB
ARRHY DURING EX: NORMAL
CHEST PAIN STATEMENT: NORMAL
MAX DIASTOLIC BP: 88 MMHG
MAX HR PERCENT: 70 %
MAX HR: 106 BPM
MAX PREDICTED HEART RATE: 151 BPM
NUC STRESS EJECTION FRACTION: 53 %
PROTOCOL NAME: NORMAL
RATE PRESSURE PRODUCT: NORMAL
REASON FOR TERMINATION: NORMAL
SL CV REST NUCLEAR ISOTOPE DOSE: 16.4 MCI
SL CV STRESS NUCLEAR ISOTOPE DOSE: 47.5 MCI
SL CV STRESS RECOVERY BP: NORMAL MMHG
SL CV STRESS RECOVERY HR: 96 BPM
SL CV STRESS RECOVERY O2 SAT: 97 %
SL CV STRESS STAGE REACHED: 1
STRESS ANGINA INDEX: 0
STRESS BASELINE BP: NORMAL MMHG
STRESS BASELINE HR: 85 BPM
STRESS O2 SAT REST: 98 %
STRESS PEAK HR: 106 BPM
STRESS POST ESTIMATED WORKLOAD: 4.6 METS
STRESS POST EXERCISE DUR MIN: 2 MIN
STRESS POST EXERCISE DUR MIN: 8 MIN
STRESS POST EXERCISE DUR SEC: 0 SEC
STRESS POST EXERCISE DUR SEC: 30 SEC
STRESS POST O2 SAT PEAK: 100 %
STRESS POST PEAK BP: 164 MMHG
STRESS POST PEAK HR: 106 BPM
STRESS POST PEAK SYSTOLIC BP: 164 MMHG
STRESS/REST PERFUSION RATIO: 0.89
TARGET HR FORMULA: NORMAL
TEST INDICATION: NORMAL

## 2024-08-13 PROCEDURE — 93017 CV STRESS TEST TRACING ONLY: CPT

## 2024-08-13 PROCEDURE — 78452 HT MUSCLE IMAGE SPECT MULT: CPT | Performed by: INTERNAL MEDICINE

## 2024-08-13 PROCEDURE — 93018 CV STRESS TEST I&R ONLY: CPT | Performed by: INTERNAL MEDICINE

## 2024-08-13 PROCEDURE — A9502 TC99M TETROFOSMIN: HCPCS

## 2024-08-13 PROCEDURE — 78452 HT MUSCLE IMAGE SPECT MULT: CPT

## 2024-08-13 PROCEDURE — 93016 CV STRESS TEST SUPVJ ONLY: CPT | Performed by: INTERNAL MEDICINE

## 2024-08-13 RX ORDER — REGADENOSON 0.08 MG/ML
0.4 INJECTION, SOLUTION INTRAVENOUS ONCE
Status: COMPLETED | OUTPATIENT
Start: 2024-08-13 | End: 2024-08-13

## 2024-08-13 RX ADMIN — REGADENOSON 0.4 MG: 0.08 INJECTION, SOLUTION INTRAVENOUS at 09:29

## 2024-08-19 ENCOUNTER — TELEPHONE (OUTPATIENT)
Dept: CARDIAC SURGERY | Facility: CLINIC | Age: 69
End: 2024-08-19

## 2024-08-19 NOTE — TELEPHONE ENCOUNTER
Called patient to reschedule appt LM making patient aware appt was canceled due to not having ct chest and echo done. Patient can call central scheduling at  to reschedule ct chest and echo and after test are complete he can reschedule appt with Dr. Chacon.

## 2024-08-27 ENCOUNTER — TELEPHONE (OUTPATIENT)
Dept: CARDIAC SURGERY | Facility: CLINIC | Age: 69
End: 2024-08-27

## 2024-08-27 NOTE — TELEPHONE ENCOUNTER
1st attempt- LVM for pt to please call the office back to reschedule CT scan of the chest as well as a follow-up with Dr. Hirsch.

## 2024-09-03 ENCOUNTER — TELEPHONE (OUTPATIENT)
Dept: CARDIAC SURGERY | Facility: CLINIC | Age: 69
End: 2024-09-03

## 2024-09-03 NOTE — TELEPHONE ENCOUNTER
2nd attempt- LVM for pt to please call the office back to reschedule CT scan of the chest as well as a follow-up with Dr. Hirsch.

## 2024-09-05 ENCOUNTER — TELEPHONE (OUTPATIENT)
Dept: CARDIAC SURGERY | Facility: CLINIC | Age: 69
End: 2024-09-05

## 2024-09-05 NOTE — TELEPHONE ENCOUNTER
I called and spoke to the pt's daughter Katie in regards to rescheduling CT scan of chest and follow-up with Dr. Hirsch. Katie informed me that her dad has moved to [place] and has been following his care there. She states all is going well so far with her dad. I told her I would notate this in his chart. Katie was appreciative of the call.

## 2024-10-06 DIAGNOSIS — I10 PRIMARY HYPERTENSION: ICD-10-CM

## 2024-10-06 DIAGNOSIS — I25.10 CORONARY ARTERY DISEASE INVOLVING NATIVE CORONARY ARTERY OF NATIVE HEART WITHOUT ANGINA PECTORIS: ICD-10-CM

## 2024-10-08 RX ORDER — METOPROLOL SUCCINATE 50 MG/1
50 TABLET, EXTENDED RELEASE ORAL DAILY
Qty: 90 TABLET | Refills: 1 | Status: SHIPPED | OUTPATIENT
Start: 2024-10-08

## 2024-10-18 DIAGNOSIS — E78.5 HYPERLIPIDEMIA, UNSPECIFIED HYPERLIPIDEMIA TYPE: ICD-10-CM

## 2024-10-18 RX ORDER — ATORVASTATIN CALCIUM 80 MG/1
80 TABLET, FILM COATED ORAL DAILY
Qty: 90 TABLET | Refills: 1 | Status: SHIPPED | OUTPATIENT
Start: 2024-10-18

## 2024-10-25 DIAGNOSIS — I10 PRIMARY HYPERTENSION: ICD-10-CM

## 2024-10-25 RX ORDER — OLMESARTAN MEDOXOMIL AND HYDROCHLOROTHIAZIDE 20/12.5 20; 12.5 MG/1; MG/1
1 TABLET ORAL DAILY
Qty: 90 TABLET | Refills: 1 | Status: SHIPPED | OUTPATIENT
Start: 2024-10-25

## 2025-01-22 ENCOUNTER — TELEPHONE (OUTPATIENT)
Dept: FAMILY MEDICINE CLINIC | Facility: MEDICAL CENTER | Age: 70
End: 2025-01-22

## 2025-01-22 NOTE — TELEPHONE ENCOUNTER
----- Message from Hedy MONTALVO sent at 1/17/2025  2:14 PM EST -----  Regarding: awv  Patient due for AWV, please contact patient to schedule.

## 2025-01-29 ENCOUNTER — OFFICE VISIT (OUTPATIENT)
Dept: CARDIOLOGY CLINIC | Facility: MEDICAL CENTER | Age: 70
End: 2025-01-29
Payer: COMMERCIAL

## 2025-01-29 VITALS
HEART RATE: 98 BPM | HEIGHT: 68 IN | SYSTOLIC BLOOD PRESSURE: 120 MMHG | OXYGEN SATURATION: 94 % | BODY MASS INDEX: 44.71 KG/M2 | WEIGHT: 295 LBS | DIASTOLIC BLOOD PRESSURE: 82 MMHG

## 2025-01-29 DIAGNOSIS — I71.21 ANEURYSM OF ASCENDING AORTA WITHOUT RUPTURE (HCC): ICD-10-CM

## 2025-01-29 DIAGNOSIS — I25.10 CORONARY ARTERY DISEASE INVOLVING NATIVE CORONARY ARTERY OF NATIVE HEART WITHOUT ANGINA PECTORIS: Primary | ICD-10-CM

## 2025-01-29 DIAGNOSIS — R07.9 CHEST PAIN, UNSPECIFIED TYPE: ICD-10-CM

## 2025-01-29 DIAGNOSIS — I10 PRIMARY HYPERTENSION: ICD-10-CM

## 2025-01-29 DIAGNOSIS — I35.1 AORTIC VALVE INSUFFICIENCY, ETIOLOGY OF CARDIAC VALVE DISEASE UNSPECIFIED: ICD-10-CM

## 2025-01-29 DIAGNOSIS — E78.5 HYPERLIPIDEMIA, UNSPECIFIED HYPERLIPIDEMIA TYPE: ICD-10-CM

## 2025-01-29 DIAGNOSIS — Z87.891 HISTORY OF TOBACCO ABUSE: ICD-10-CM

## 2025-01-29 PROCEDURE — 99214 OFFICE O/P EST MOD 30 MIN: CPT | Performed by: INTERNAL MEDICINE

## 2025-01-29 RX ORDER — NITROGLYCERIN 0.4 MG/1
0.4 TABLET SUBLINGUAL
Qty: 20 TABLET | Refills: 3 | Status: SHIPPED | OUTPATIENT
Start: 2025-01-29

## 2025-01-29 NOTE — PROGRESS NOTES
Cardiology Follow Up    Javier Conley  1955  [id number]  [facility] [place]  [address]  [address]  [facility] [id number] [phone]    1. Coronary artery disease involving native coronary artery of native heart without angina pectoris        2. Primary hypertension        3. Aneurysm of ascending aorta without rupture (HCC)        4. Aortic valve insufficiency, etiology of cardiac valve disease unspecified        5. Hyperlipidemia, unspecified hyperlipidemia type        6. History of tobacco abuse            Diagnoses and all orders for this visit:    Coronary artery disease involving native coronary artery of native heart without angina pectoris    Primary hypertension    Aneurysm of ascending aorta without rupture (HCC)    Aortic valve insufficiency, etiology of cardiac valve disease unspecified    Hyperlipidemia, unspecified hyperlipidemia type    History of tobacco abuse      I had the pleasure of seeing Javier Conley for a follow up visit.     INTERVAL HISTORY: none    History of the presenting illness, Discussion/Summary and My Plan are as follows:::    Javier is a pleasant 69-year-old gentleman who speaks mostly Polish.  He has a history of coronary disease, balloon angioplasty-ramus and diagonal 1, distal left main stenosis which was deemed not hemodynamically significant at that time-all in 2019-I suspect in the setting of chest pains.  He still smokes     He also has a history of hypertension, dyslipidemia, remote DVT-around her prior to 2014, tobacco use-(quit in June 2024 but picked it up again), a lung nodule under surveillance and mild to moderate AI on echocardiogram report from 2019 - moderate in 2024.     Jan 2024 -  CT scan of his lungs showed a 4.7 cm fusiform aortic aneurysm and is due for serial follow ups     He is not acutely symptomatic from a cardiac standpoint, is moderately  active.  He has had shronic shortness of breath with exertion which she feels is very stable.  His BMI is 45.  He also has chronic random chest tightness that lasts about 10 to 15 minutes without associated sweating or shortness of breath, mostly with increased stress or sometimes with activity (leaning low, mowing the grass)or relieving factors that has been stable for 2 years now     In the past he had followed at [place] with Dr. Judd and presents to [facility] care since he lives in [place].     History was obtained through  services through an iPad.     Plan:      Coronary artery disease: See  anatomy below.   Now with chronic CP with typical and atypical features.,  No change since his last visit.  Negative Pharm nuc stress test, personally reviewed - done in Aug 2024  Unclear etiology of his chest pains but as needed nitroglycerin was prescribed, he will try and if it helps can take it.  He does have risk factors and chronic CAD.  He feels his shortness of breath has been stable.  Speaks Polish only - history was obtained through  services through an iPad.     Mild to moderate AI on last echo-2019-2024: stable on echo recheck an echo in 2025     Aortic aneurysm: At 4.7 cm (Jan 2024), indexed to his body surface area of 2.5-1.9 cm/meters squared which is dilated, he has serial follow-up CT lung studies which will be CCed to me as well to assess for increase in size.    It was reported at 4.7 cm in January 2024.  An appointment was made with cardiac surgery but it appears that it was canceled.    (It appears to have been stable in size, reported as 4.6 x 5 in 2019 on CT scan report on Care Everywhere)  He is 6 feet tall  On echo Ao root at 3.8 and ascending aorta at 4.1 cm but less reliable than CT  Await non contrast CT in 2025 (undergoing lung ca surveillance anyway) and then have him see cardiac surgery, discussed with him and wife     Hypertension: Controlled, high salt intake      Dyslipidemia: On a statin and controlled, recheck lipids     Tobacco cessation    Follow-up in 6 months     Latest Reference Range & Units 23 14:14   Cholesterol See Comment mg/dL 115   Triglycerides See Comment mg/dL 127   HDL >=40 mg/dL 45   Non-HDL Cholesterol mg/dl 70   LDL Calculated 0 - 100 mg/dL 45      Latest Reference Range & Units 19 07:36 23 14:14   BUN 5 - 25 mg/dL 12 (E) 9   Creatinine 0.60 - 1.30 mg/dL 0.8 (E) 0.81   (E): External lab result   Latest Reference Range & Units 19 15:07   Hemoglobin A1C 4.0 - 5.6 % 5.9 (H) (E)   eAG, EST AVG Glucose <126  123 (E)   (H): Data is abnormally high  (E): External lab result  Patient Active Problem List   Diagnosis    Coronary artery disease involving native coronary artery of native heart without angina pectoris    Hyperlipidemia    Primary hypertension    Hx of skin cancer, basal cell    Obesity, morbid (HCC)    COPD (chronic obstructive pulmonary disease) (HCC)    Aneurysm of ascending aorta without rupture (HCC)    Aortic valve regurgitation    Exertional dyspnea    Exertional chest pain    History of tobacco abuse    Abnormal CT of the chest    Pulmonary nodule    Tobacco abuse counseling    Chest pain    LENTZ (dyspnea on exertion)     Past Medical History:   Diagnosis Date    Class 2 obesity     COPD (chronic obstructive pulmonary disease) with emphysema (HCC)     Coronary artery disease     Heavy smoker     Hyperlipidemia     Hypertension     Sarcoidosis      Social History     Socioeconomic History    Marital status: /Civil Union     Spouse name: Not on file    Number of children: Not on file    Years of education: Not on file    Highest education level: Not on file   Occupational History    Not on file   Tobacco Use    Smoking status: Former     Current packs/day: 0.00     Average packs/day: 1 pack/day for 50.0 years (50.0 ttl pk-yrs)     Types: Cigarettes     Quit date: 2024     Years since quittin.6     Passive  exposure: Past    Smokeless tobacco: Never   Vaping Use    Vaping status: Never Used   Substance and Sexual Activity    Alcohol use: Yes     Alcohol/week: 5.0 standard drinks of alcohol     Types: 5 Cans of beer per week     Comment: socially    Drug use: Never    Sexual activity: Not on file   Other Topics Concern    Not on file   Social History Narrative    Not on file     Social Drivers of Health     Financial Resource Strain: Low Risk  (11/21/2023)    Overall Financial Resource Strain (CARDIA)     Difficulty of Paying Living Expenses: Not hard at all   Food Insecurity: Not on file   Transportation Needs: No Transportation Needs (11/21/2023)    PRAPARE - Transportation     Lack of Transportation (Medical): No     Lack of Transportation (Non-Medical): No   Physical Activity: Not on file   Stress: Not on file   Social Connections: Unknown (6/18/2024)    Received from [email or web address]     How often do you feel lonely or isolated from those around you? (Adult - for ages 18 years and over): Not on file   Intimate Partner Violence: Not on file   Housing Stability: Not on file      Family History   Problem Relation Age of Onset    Hypertension Mother     Stomach cancer Sister      Past Surgical History:   Procedure Laterality Date    COLONOSCOPY  01/18/2024       Current Outpatient Medications:     Aspirin 81 MG CAPS, Take 81 mg by mouth daily, Disp: , Rfl:     atorvastatin (LIPITOR) 80 mg tablet, TAKE 1 TABLET BY MOUTH EVERY DAY, Disp: 90 tablet, Rfl: 1    metoprolol succinate (TOPROL-XL) 50 mg 24 hr tablet, TAKE 1 TABLET BY MOUTH EVERY DAY, Disp: 90 tablet, Rfl: 1    naproxen (Naprosyn) 500 mg tablet, Take 1 tablet (500 mg total) by mouth 2 (two) times a day as needed for mild pain, Disp: 30 tablet, Rfl: 0    olmesartan-hydrochlorothiazide (BENICAR HCT) 20-12.5 MG per tablet, TAKE 1 TABLET BY MOUTH EVERY DAY, Disp: 90 tablet, Rfl: 1    carbamide peroxide (DEBROX) 6.5 % otic solution, Administer 5 drops  "into both ears 2 (two) times a day for 14 days, Disp: 15 mL, Rfl: 0    varenicline (CHANTIX) 0.5 mg tablet, TAKE 1 TABLET (0.5 MG TOTAL) BY MOUTH DAILY FOR 3 DAYS, THEN 1 TABLET (0.5 MG TOTAL) 2 (TWO) TIMES A DAY FOR 4 DAYS, THEN 2 TABLETS (1 MG TOTAL) 2 (TWO) TIMES A DAY., Disp: 151 tablet, Rfl: 0  No Known Allergies    Imaging: No results found.    Review of Systems:  Review of Systems   Constitutional: Negative.    HENT: Negative.     Eyes: Negative.    Respiratory:  Positive for chest tightness. Negative for apnea, cough, choking, shortness of breath, wheezing and stridor.    Cardiovascular:  Positive for chest pain. Negative for palpitations and leg swelling.   Musculoskeletal: Negative.        Physical Exam:  /82 (BP Location: Left arm, Patient Position: Sitting, Cuff Size: Adult)   Pulse 98   Ht 5' 8\" (1.727 m)   Wt 134 kg (295 lb)   SpO2 94%   BMI 44.85 kg/m²   Physical Exam  Constitutional:       General: He is not in acute distress.     Appearance: He is not ill-appearing, toxic-appearing or diaphoretic.   HENT:      Head: Normocephalic and atraumatic.      Nose: Nose normal. No congestion or rhinorrhea.      Mouth/Throat:      Mouth: Mucous membranes are moist.      Pharynx: No oropharyngeal exudate or posterior oropharyngeal erythema.   Neck:      Vascular: No carotid bruit.   Cardiovascular:      Rate and Rhythm: Normal rate.      Heart sounds: No murmur heard.     No friction rub. No gallop.   Pulmonary:      Effort: Pulmonary effort is normal. No respiratory distress.      Breath sounds: No stridor. No wheezing or rhonchi.   Abdominal:      General: Abdomen is flat. There is no distension.      Palpations: Abdomen is soft. There is no mass.      Tenderness: There is no abdominal tenderness.      Hernia: No hernia is present.   Musculoskeletal:         General: No swelling, tenderness, deformity or signs of injury. Normal range of motion.      Cervical back: Normal range of motion. No " "rigidity or tenderness.   Lymphadenopathy:      Cervical: No cervical adenopathy.   Skin:     General: Skin is warm.      Coloration: Skin is not jaundiced or pale.      Findings: No bruising or erythema.   Neurological:      Mental Status: He is alert.         This note was completed in part utilizing [employer] direct voice recognition software.   Grammatical errors, random word insertion, spelling mistakes, occasional wrong word or \"sound-alike\" substitutions and incomplete sentences may be an occasional consequence of the system secondary to software limitations, ambient noise and hardware issues. At the time of dictation, efforts were made to edit, clarify and /or correct errors.  Please read the chart carefully and recognize, using context, where substitutions have occurred.  If you have any questions or concerns about the context, text or information contained within the body of this dictation, please contact myself, the provider, for further clarification.  "

## 2025-02-26 ENCOUNTER — TELEPHONE (OUTPATIENT)
Dept: FAMILY MEDICINE CLINIC | Facility: CLINIC | Age: 70
End: 2025-02-26

## 2025-02-26 NOTE — TELEPHONE ENCOUNTER
----- Message from Hedy MONTALVO sent at 2/19/2025 10:33 AM EST -----  Regarding: awv  Patient due for AWV, please contact patient to schedule.

## 2025-04-23 DIAGNOSIS — I10 PRIMARY HYPERTENSION: ICD-10-CM

## 2025-04-23 RX ORDER — OLMESARTAN MEDOXOMIL AND HYDROCHLOROTHIAZIDE 20/12.5 20; 12.5 MG/1; MG/1
1 TABLET ORAL DAILY
Qty: 30 TABLET | Refills: 0 | Status: SHIPPED | OUTPATIENT
Start: 2025-04-23

## 2025-05-15 ENCOUNTER — DOCUMENTATION (OUTPATIENT)
Dept: ADMINISTRATIVE | Facility: OTHER | Age: 70
End: 2025-05-15

## 2025-05-15 ENCOUNTER — TELEPHONE (OUTPATIENT)
Dept: FAMILY MEDICINE CLINIC | Facility: CLINIC | Age: 70
End: 2025-05-15

## 2025-05-15 NOTE — PROGRESS NOTES
05/15/25 10:19 AM    Annual Wellness Visit outreach is not required, patient was called within the last 3 months.    Thank you.  Luis Carlos Betancourt MA  [facility]

## 2025-05-15 NOTE — TELEPHONE ENCOUNTER
----- Message from Hedy MONTALVO sent at 4/25/2025 10:48 AM EDT -----  Regarding: awv  Patient due for AWV, please contact patient to schedule.

## 2025-05-16 DIAGNOSIS — I10 PRIMARY HYPERTENSION: ICD-10-CM

## 2025-05-19 ENCOUNTER — TELEPHONE (OUTPATIENT)
Dept: PLASTIC SURGERY | Facility: CLINIC | Age: 70
End: 2025-05-19

## 2025-05-19 RX ORDER — OLMESARTAN MEDOXOMIL AND HYDROCHLOROTHIAZIDE 20/12.5 20; 12.5 MG/1; MG/1
1 TABLET ORAL DAILY
Qty: 1 TABLET | Refills: 0 | OUTPATIENT
Start: 2025-05-19

## 2025-05-19 NOTE — TELEPHONE ENCOUNTER
Received call from Patient's Daughter on behalf of Patient for Follow Up -SOC - Nodule on head. Scheduled 6/24/25 3:00 pm [place], verified insurance, provided [place] addr.     Patient verbalized understanding.

## 2025-05-20 DIAGNOSIS — I10 PRIMARY HYPERTENSION: ICD-10-CM

## 2025-05-20 RX ORDER — OLMESARTAN MEDOXOMIL AND HYDROCHLOROTHIAZIDE 20/12.5 20; 12.5 MG/1; MG/1
1 TABLET ORAL DAILY
Qty: 30 TABLET | Refills: 0 | OUTPATIENT
Start: 2025-05-20

## 2025-06-03 ENCOUNTER — TELEPHONE (OUTPATIENT)
Dept: PLASTIC SURGERY | Facility: CLINIC | Age: 70
End: 2025-06-03

## 2025-06-03 NOTE — TELEPHONE ENCOUNTER
LVM to inform pt we have to change appt on 6/24 - informed pt we do have earlier appts with Morgan open if he was intrested - asked pt to please give us a call back in order to get this rescheduled.       If pt calls back please schedule him with Morgan - if pt wants an earlier date please message me and I will change from PO type - first open is fine. Thank you!

## 2025-06-04 NOTE — TELEPHONE ENCOUNTER
Patient's wife called office to confirm new appointment date for 6/24/2025, time was also provided.    They will be there, thank you.

## 2025-06-18 ENCOUNTER — TELEPHONE (OUTPATIENT)
Dept: FAMILY MEDICINE CLINIC | Facility: CLINIC | Age: 70
End: 2025-06-18

## 2025-06-24 ENCOUNTER — OFFICE VISIT (OUTPATIENT)
Dept: PLASTIC SURGERY | Facility: CLINIC | Age: 70
End: 2025-06-24
Payer: COMMERCIAL

## 2025-06-24 VITALS
HEART RATE: 74 BPM | HEIGHT: 68 IN | TEMPERATURE: 93.3 F | DIASTOLIC BLOOD PRESSURE: 99 MMHG | WEIGHT: 295 LBS | SYSTOLIC BLOOD PRESSURE: 123 MMHG | BODY MASS INDEX: 44.71 KG/M2

## 2025-06-24 DIAGNOSIS — L72.0 EPIDERMAL CYST OF FACE: Primary | ICD-10-CM

## 2025-06-24 PROCEDURE — 99202 OFFICE O/P NEW SF 15 MIN: CPT

## 2025-06-25 NOTE — PROGRESS NOTES
"[facility] Plastic and Reconstructive Surgery  [address], [address], [place] [address]  [phone]    Patient Identification: Javier Conley is a 69 y.o. male     History of Present Illness: The patient is a 69 y.o.  year-old male  who presents to the office for a new patient consult regarding a left temple mass. Pt has family present to provide translation.    Patient states the left temple mass has been present for \"years\" and has slowly grown larger over time. He denies pain, discharge or history of infection to the area. Pt states he had similar masses removed in the past to which he believes were lipomas. He denies any headaches, vision changes, numbness, tingling, or other symptoms at this time.     Incidentally in 20240, the patient received a PET CT for a pulmonary nodule work up, showing the mass is consistent with a cystic structure.    Pt states he has chronic cardiac and pulmonary conditions (AAA, pulmonary nodule) that are deemed stable, follows with a cardiologist and pulmonologist. He takes 81mg aspirin daily. Pt admits to tobacco use, smokes 1 PPD.   Patient denies factors contributing to poor wound healing such as diabetes, chronic steroid use, immunotherapy/chemotherapy drug use.       Past Medical History:   Diagnosis Date    Class 2 obesity     COPD (chronic obstructive pulmonary disease) with emphysema (HCC)     Coronary artery disease     Heavy smoker     Hyperlipidemia     Hypertension     Sarcoidosis       Review of Systems  Constitutional: Denies fevers, chills or pain.  Skin: Denies any warmth, erythema, edema or mucopurulent drainage.     Physical Exam  Vitals:    06/24/25 1306   BP: 123/99   Pulse: 74   Temp: (!) 93.3 °F (34.1 °C)     Constitutional:AAOx3, well-developed, no distress. Pt is cooperative and pleasant.  Cardiovascular: Normal rate  Pulmonary/Chest: Effort normal and breath sounds normal. No respiratory distress.  Neuro: Gait in tact. Reflexes and motor " strength normal and symmetric. Cranial nerves 2-12 grossly intact  Psychiatric: Has appropriate behavior and thought process.   Extremities: Full ROM, no gross abnormalities.  Skin: Left  temple mass measuring 2.5x 2.5cm. Soft, circumferential, mobile, no punctum. See media    Assessment and Plan:  The patient is an 69 y.o.  year-old male who presents to the office for new patient consult regarding a facial mass.    -At today's visit, chart reviewed, history obtained and exam conducted. Mass is consistent with cystic structure/lipoma  -Discussed options of care with the patient, including excision within the OR setting. Details of the procedure and after-care were reviewed with the patient. Mass will be sent for pathology review, resulting in 7-14 days. Area will be closed with sutures and replaced with a scar. Pt will be educated on scar care and use of silicone scar cream in post-operative period.   - Reviewed the risks of the procedure, such as bleeding, infection, asymmetry, contour deformity, scarring, wound healing problems, anesthesia risks, and need for additional procedures. Patient would like to move forward with scheduling.  -He will need clearance from cardiology and pulmonology if the procedure is not under local anesthesia.  -Discussed nicotine and its negative effects upon wound healing. Encouraged smoking cessation. All questions answered.  -Patient is to follow up with Dr. Castle for a pre-op visit.    -The patient is to call the office with any questions or concerns. All of the patient's questions were answered at this time and they agree with the plan of care.    I have spent a total time of 25 minutes in caring for this patient on the day of the visit/encounter including Patient and family education, Importance of tx compliance, Risk factor reductions, Impressions, Documenting in the medical record, Reviewing/placing orders in the medical record (including tests, medications, and/or procedures),  and Obtaining or reviewing history  .    Chika Meek PA-C  [facility] Plastic and Reconstructive Surgery

## 2025-07-08 ENCOUNTER — OFFICE VISIT (OUTPATIENT)
Dept: PLASTIC SURGERY | Facility: CLINIC | Age: 70
End: 2025-07-08
Payer: COMMERCIAL

## 2025-07-08 VITALS
HEIGHT: 68 IN | DIASTOLIC BLOOD PRESSURE: 89 MMHG | TEMPERATURE: 98 F | BODY MASS INDEX: 44.71 KG/M2 | WEIGHT: 295 LBS | HEART RATE: 85 BPM | SYSTOLIC BLOOD PRESSURE: 143 MMHG

## 2025-07-08 DIAGNOSIS — R22.9 SUBCUTANEOUS MASS: Primary | ICD-10-CM

## 2025-07-08 PROCEDURE — 99203 OFFICE O/P NEW LOW 30 MIN: CPT | Performed by: PLASTIC SURGERY

## 2025-07-08 NOTE — H&P (VIEW-ONLY)
[facility]   Plastic and Reconstructive Surgery   [address] [place] [address]     HISTORY & PHYSICAL      Assessment & Plan  Subcutaneous mass      I have spent a total time of 30 minutes in caring for this patient on the day of the visit/encounter including Risks and benefits of tx options and Counseling / Coordination of care.  Assessment & Plan  1. Mass on the left side.  The mass appears to be a cyst or a small piece of fat. An ultrasound will be conducted to confirm this. The surgical procedure involves making an incision over the mass, removing it, and sending it to pathology for further examination. The surgery is expected to last about 30 minutes, after which he can return home on the same day. The potential risks associated with the surgery, including bleeding, infection, scarring, poor wound healing, damage to surrounding structures, numbness or weakness, and the possibility of an open wound requiring dressing changes, were discussed. It was also explained that there is a chance of the mass recurring, which could take months to years. He consented to the use of photos or images from his surgery for training and education purposes. He also agreed to receive blood if needed and to have his heart restarted if it stops during the procedure. The surgery will be performed under local anesthesia at the [facility].        History of Present Illness   History of Present Illness  The patient presents for a mass on the left side. He is accompanied by his wife and daughter. A  was present.    He has been experiencing this issue for approximately 3 years, with the mass increasing in size over time. There has been no reduction in its size, nor has there been any drainage from it. He reports no pain associated with the mass. He has not undergone any surgical procedures related to this mass. He has previously undergone ultrasounds and other scans for this issue.    Supplemental  Information  He has a history of hypertension. He also mentions a past incident where he had a small tumor or node on his right side, which was surgically removed and confirmed to be benign.      Review of Systems    Past Medical History[1]     Past Surgical History[2]    Medications Ordered Prior to Encounter[3]    No Known Allergies    Social History     Socioeconomic History    Marital status: /Civil Union     Spouse name: Not on file    Number of children: Not on file    Years of education: Not on file    Highest education level: Not on file   Occupational History    Not on file   Tobacco Use    Smoking status: Every Day     Current packs/day: 0.00     Average packs/day: 1 pack/day for 50.0 years (50.0 ttl pk-yrs)     Types: Cigarettes     Last attempt to quit: 2024     Years since quittin.0     Passive exposure: Past    Smokeless tobacco: Never    Tobacco comments:     13-20 cigarettes a day   Vaping Use    Vaping status: Never Used   Substance and Sexual Activity    Alcohol use: Yes     Alcohol/week: 5.0 standard drinks of alcohol     Types: 5 Cans of beer per week     Comment: socially    Drug use: Never    Sexual activity: Not on file   Other Topics Concern    Not on file   Social History Narrative    Not on file     Social Drivers of Health     Financial Resource Strain: Low Risk  (2023)    Overall Financial Resource Strain (CARDIA)     Difficulty of Paying Living Expenses: Not hard at all   Food Insecurity: Not on file   Transportation Needs: No Transportation Needs (2023)    PRAPARE - Transportation     Lack of Transportation (Medical): No     Lack of Transportation (Non-Medical): No   Physical Activity: Not on file   Stress: Not on file   Social Connections: Unknown (2024)    Received from [employer]    Social [place]     How often do you feel lonely or isolated from those around you? (Adult - for ages 18 years and over): Not on file   Intimate Partner Violence: Not on  file   Housing Stability: Not on file           Physical Exam   Alert, oriented, NAD  Breathing comfortably on room air, normal work of breathing  Large 2.5x2cm soft mobile mass of the left frontotemporal scalp.  No appreciable punctum, drainage.  Abdomen soft              [1]   Past Medical History:  Diagnosis Date    Class 2 obesity     COPD (chronic obstructive pulmonary disease) with emphysema (HCC)     Coronary artery disease     Heavy smoker     Hyperlipidemia     Hypertension     Sarcoidosis    [2]   Past Surgical History:  Procedure Laterality Date    COLONOSCOPY  01/18/2024   [3]   Current Outpatient Medications on File Prior to Visit   Medication Sig Dispense Refill    atorvastatin (LIPITOR) 80 mg tablet TAKE 1 TABLET BY MOUTH EVERY DAY 90 tablet 1    metoprolol succinate (TOPROL-XL) 50 mg 24 hr tablet TAKE 1 TABLET BY MOUTH EVERY DAY 90 tablet 1    naproxen (Naprosyn) 500 mg tablet Take 1 tablet (500 mg total) by mouth 2 (two) times a day as needed for mild pain 30 tablet 0    nitroglycerin (NITROSTAT) 0.4 mg SL tablet Place 1 tablet (0.4 mg total) under the tongue every 5 (five) minutes as needed for chest pain 20 tablet 3    Aspirin 81 MG CAPS Take 81 mg by mouth in the morning. (Patient not taking: Reported on 7/8/2025)      carbamide peroxide (DEBROX) 6.5 % otic solution Administer 5 drops into both ears 2 (two) times a day for 14 days 15 mL 0    olmesartan-hydrochlorothiazide (BENICAR HCT) 20-12.5 MG per tablet TAKE 1 TABLET BY MOUTH EVERY DAY (Patient not taking: Reported on 7/8/2025) 30 tablet 0    varenicline (CHANTIX) 0.5 mg tablet TAKE 1 TABLET (0.5 MG TOTAL) BY MOUTH DAILY FOR 3 DAYS, THEN 1 TABLET (0.5 MG TOTAL) 2 (TWO) TIMES A DAY FOR 4 DAYS, THEN 2 TABLETS (1 MG TOTAL) 2 (TWO) TIMES A DAY. (Patient not taking: Reported on 6/24/2025) 151 tablet 0     No current facility-administered medications on file prior to visit.

## 2025-07-08 NOTE — PROGRESS NOTES
[facility]   Plastic and Reconstructive Surgery   [address] [place] [address]     HISTORY & PHYSICAL      Assessment & Plan  Subcutaneous mass      I have spent a total time of 30 minutes in caring for this patient on the day of the visit/encounter including Risks and benefits of tx options and Counseling / Coordination of care.  Assessment & Plan  1. Mass on the left side.  The mass appears to be a cyst or a small piece of fat. An ultrasound will be conducted to confirm this. The surgical procedure involves making an incision over the mass, removing it, and sending it to pathology for further examination. The surgery is expected to last about 30 minutes, after which he can return home on the same day. The potential risks associated with the surgery, including bleeding, infection, scarring, poor wound healing, damage to surrounding structures, numbness or weakness, and the possibility of an open wound requiring dressing changes, were discussed. It was also explained that there is a chance of the mass recurring, which could take months to years. He consented to the use of photos or images from his surgery for training and education purposes. He also agreed to receive blood if needed and to have his heart restarted if it stops during the procedure. The surgery will be performed under local anesthesia at the [facility].        History of Present Illness   History of Present Illness  The patient presents for a mass on the left side. He is accompanied by his wife and daughter. A  was present.    He has been experiencing this issue for approximately 3 years, with the mass increasing in size over time. There has been no reduction in its size, nor has there been any drainage from it. He reports no pain associated with the mass. He has not undergone any surgical procedures related to this mass. He has previously undergone ultrasounds and other scans for this issue.    Supplemental  Information  He has a history of hypertension. He also mentions a past incident where he had a small tumor or node on his right side, which was surgically removed and confirmed to be benign.      Review of Systems    Past Medical History[1]     Past Surgical History[2]    Medications Ordered Prior to Encounter[3]    No Known Allergies    Social History     Socioeconomic History    Marital status: /Civil Union     Spouse name: Not on file    Number of children: Not on file    Years of education: Not on file    Highest education level: Not on file   Occupational History    Not on file   Tobacco Use    Smoking status: Every Day     Current packs/day: 0.00     Average packs/day: 1 pack/day for 50.0 years (50.0 ttl pk-yrs)     Types: Cigarettes     Last attempt to quit: 2024     Years since quittin.0     Passive exposure: Past    Smokeless tobacco: Never    Tobacco comments:     13-20 cigarettes a day   Vaping Use    Vaping status: Never Used   Substance and Sexual Activity    Alcohol use: Yes     Alcohol/week: 5.0 standard drinks of alcohol     Types: 5 Cans of beer per week     Comment: socially    Drug use: Never    Sexual activity: Not on file   Other Topics Concern    Not on file   Social History Narrative    Not on file     Social Drivers of Health     Financial Resource Strain: Low Risk  (2023)    Overall Financial Resource Strain (CARDIA)     Difficulty of Paying Living Expenses: Not hard at all   Food Insecurity: Not on file   Transportation Needs: No Transportation Needs (2023)    PRAPARE - Transportation     Lack of Transportation (Medical): No     Lack of Transportation (Non-Medical): No   Physical Activity: Not on file   Stress: Not on file   Social Connections: Unknown (2024)    Received from [employer]    Social [place]     How often do you feel lonely or isolated from those around you? (Adult - for ages 18 years and over): Not on file   Intimate Partner Violence: Not on  file   Housing Stability: Not on file           Physical Exam   Alert, oriented, NAD  Breathing comfortably on room air, normal work of breathing  Large 2.5x2cm soft mobile mass of the left frontotemporal scalp.  No appreciable punctum, drainage.  Abdomen soft              [1]   Past Medical History:  Diagnosis Date    Class 2 obesity     COPD (chronic obstructive pulmonary disease) with emphysema (HCC)     Coronary artery disease     Heavy smoker     Hyperlipidemia     Hypertension     Sarcoidosis    [2]   Past Surgical History:  Procedure Laterality Date    COLONOSCOPY  01/18/2024   [3]   Current Outpatient Medications on File Prior to Visit   Medication Sig Dispense Refill    atorvastatin (LIPITOR) 80 mg tablet TAKE 1 TABLET BY MOUTH EVERY DAY 90 tablet 1    metoprolol succinate (TOPROL-XL) 50 mg 24 hr tablet TAKE 1 TABLET BY MOUTH EVERY DAY 90 tablet 1    naproxen (Naprosyn) 500 mg tablet Take 1 tablet (500 mg total) by mouth 2 (two) times a day as needed for mild pain 30 tablet 0    nitroglycerin (NITROSTAT) 0.4 mg SL tablet Place 1 tablet (0.4 mg total) under the tongue every 5 (five) minutes as needed for chest pain 20 tablet 3    Aspirin 81 MG CAPS Take 81 mg by mouth in the morning. (Patient not taking: Reported on 7/8/2025)      carbamide peroxide (DEBROX) 6.5 % otic solution Administer 5 drops into both ears 2 (two) times a day for 14 days 15 mL 0    olmesartan-hydrochlorothiazide (BENICAR HCT) 20-12.5 MG per tablet TAKE 1 TABLET BY MOUTH EVERY DAY (Patient not taking: Reported on 7/8/2025) 30 tablet 0    varenicline (CHANTIX) 0.5 mg tablet TAKE 1 TABLET (0.5 MG TOTAL) BY MOUTH DAILY FOR 3 DAYS, THEN 1 TABLET (0.5 MG TOTAL) 2 (TWO) TIMES A DAY FOR 4 DAYS, THEN 2 TABLETS (1 MG TOTAL) 2 (TWO) TIMES A DAY. (Patient not taking: Reported on 6/24/2025) 151 tablet 0     No current facility-administered medications on file prior to visit.

## 2025-07-11 ENCOUNTER — PREP FOR PROCEDURE (OUTPATIENT)
Dept: PLASTIC SURGERY | Facility: CLINIC | Age: 70
End: 2025-07-11

## 2025-07-11 DIAGNOSIS — L72.0 EPIDERMAL CYST OF FACE: Primary | ICD-10-CM

## 2025-07-23 ENCOUNTER — HOSPITAL ENCOUNTER (OUTPATIENT)
Facility: AMBULARY SURGERY CENTER | Age: 70
Setting detail: OUTPATIENT SURGERY
Discharge: HOME | End: 2025-07-23
Attending: PLASTIC SURGERY | Admitting: PLASTIC SURGERY
Payer: COMMERCIAL

## 2025-07-23 VITALS
SYSTOLIC BLOOD PRESSURE: 134 MMHG | WEIGHT: 295 LBS | HEIGHT: 68 IN | RESPIRATION RATE: 18 BRPM | BODY MASS INDEX: 44.71 KG/M2 | TEMPERATURE: 98.7 F | DIASTOLIC BLOOD PRESSURE: 68 MMHG | OXYGEN SATURATION: 95 % | HEART RATE: 83 BPM

## 2025-07-23 DIAGNOSIS — L72.0 EPIDERMAL CYST OF FACE: ICD-10-CM

## 2025-07-23 PROCEDURE — 12031 INTMD RPR S/A/T/EXT 2.5 CM/<: CPT | Performed by: PLASTIC SURGERY

## 2025-07-23 PROCEDURE — 88304 TISSUE EXAM BY PATHOLOGIST: CPT | Performed by: PATHOLOGY

## 2025-07-23 PROCEDURE — 11622 EXC S/N/H/F/G MAL+MRG 1.1-2: CPT | Performed by: PLASTIC SURGERY

## 2025-07-23 RX ORDER — ACETAMINOPHEN 325 MG/1
975 TABLET ORAL EVERY 6 HOURS PRN
Status: DISCONTINUED | OUTPATIENT
Start: 2025-07-23 | End: 2025-07-23

## 2025-07-23 RX ORDER — ACETAMINOPHEN 325 MG/1
975 TABLET ORAL EVERY 6 HOURS PRN
Status: DISCONTINUED | OUTPATIENT
Start: 2025-07-23 | End: 2025-07-23 | Stop reason: HOSPADM

## 2025-07-23 RX ORDER — LIDOCAINE HYDROCHLORIDE AND EPINEPHRINE 10; 10 MG/ML; UG/ML
INJECTION, SOLUTION INFILTRATION; PERINEURAL AS NEEDED
Status: DISCONTINUED | OUTPATIENT
Start: 2025-07-23 | End: 2025-07-23 | Stop reason: HOSPADM

## 2025-07-23 RX ORDER — MAGNESIUM HYDROXIDE 1200 MG/15ML
LIQUID ORAL AS NEEDED
Status: DISCONTINUED | OUTPATIENT
Start: 2025-07-23 | End: 2025-07-23 | Stop reason: HOSPADM

## 2025-07-23 NOTE — INTERVAL H&P NOTE
H&P reviewed. After examining the patient I find no changes in the patients condition since the H&P had been written.    Vitals:    07/23/25 0857   BP: 141/82   Pulse: 81   Resp: 18   Temp: 98 °F (36.7 °C)   SpO2: 95%     Alert, oriented, no acute distress  Breathing comfortably room air, symmetric chest rise, normal work of breathing  3 cm x 2 cm soft mobile subcutaneous mass of the left frontal parietal scalp.  No appreciable punctum.  No appreciable drainage or overlying wound.  No fluctuance or erythema.  Extremities without deformity or significant edema.    ASA: 3    Assessment and plan: Patient with a large subcutaneous mass of the left frontoparietal scalp which has grown over time.  Appearance on PET scan indicates possible inclusion cyst.  No appreciable vascular component or palpable pulse.  Plan for excision and closure.  Reviewed the procedure as well as the associated risks.  Patient and family acknowledged the planned procedure as well as the risks.  Patient elects to proceed with surgery

## 2025-07-23 NOTE — DISCHARGE INSTR - AVS FIRST PAGE
Post-Op Discharge Instructions  Dr. Freddy Castle  [facility] Plastic and Reconstructive Surgery  [phone]    You may shower in 24 hours. Do not scrub incision sites.     Your dressings should stay in place until seen in the office. Please do not get dressings wet or submerge them in water until seen in the office    No strenuous activity    While you should avoid strenuous activity you should be out of bed and moving around.  Please ambulate at least 4 times throughout the day.  Try to only be in bed when its time to sleep.    Please lay on your back, with your head at an incline.     Your first post-op appointment is scheduled for 7/30/25 @ 9:30AM at the [facility].    Take following medications with a checkmark ([x]) as prescribed, and do not take any pain medication on an empty stomach.  [x]Tylenol 1000mg, every 6 hours as needed for mild pain.    Resume any prior medications at home unless otherwise instructed by Dr. Castle.    For the next 24 hours:  a. Do not sign any legal documents or operate machinery.  b. Have a responsible adult help you.  c. Take it easy & rest.    Call Dr. Castle at the office [phone] if any:   a. Obvious bleeding, excessive swelling, or warmth at the site  b. Fever over 101.0°  c. Shortness of breath, severe calf or thigh pain.  d. Redness, odor, or pus at the wound. (Some oozing is normal from incision sites and may continue for several days)  e. Persistent vomiting or pain that is not relieved by your medication.

## 2025-07-23 NOTE — QUICK NOTE
Patient may be discharged to home following the procedure. He may drive as the procedure was under local anesthesia.

## 2025-07-28 PROCEDURE — 88305 TISSUE EXAM BY PATHOLOGIST: CPT | Performed by: PATHOLOGY

## 2025-07-30 ENCOUNTER — OFFICE VISIT (OUTPATIENT)
Age: 70
End: 2025-07-30

## 2025-07-30 DIAGNOSIS — C44.310 BASAL CELL CARCINOMA, FACE: Primary | ICD-10-CM

## 2025-08-07 ENCOUNTER — OFFICE VISIT (OUTPATIENT)
Age: 70
End: 2025-08-07

## 2025-08-08 NOTE — OP NOTE
OPERATIVE REPORT  PATIENT NAME: Javier Conley    :  1955  MRN: [id number]  Pt Location: [facility] OR ROOM 05    SURGERY DATE: 2025    Surgeons and Role:     * Freddy Castle MD - Primary     * Fransico Barry MD - Assisting     * Chika Meek PA-C - Assisting    Preop Diagnosis:  Epidermal cyst of face [L72.0]    Post-Op Diagnosis Codes:     * Epidermal cyst of face [L72.0]    Procedure(s):  1) Excision of left frontotemporal scalp subcutaneous mass with closure (1.5 cm).    Specimen(s):  ID Type Source Tests Collected by Time Destination   1 : Left frontotemporal scalp mass Tissue Skin, Cyst/Tag/Debridement TISSUE EXAM Freddy Castle MD 2025 1119        Estimated Blood Loss:   Minimal    Drains:  * No LDAs found *    Anesthesia Type:   Local    Operative Indications:  Epidermal cyst of face [L72.0]    Patient presents with a slowly growing round subcutaneous mass over the left frontotemporal scalp.  Patient notes that he has had this for at least a year and has slowly grown larger.  Patient denies any pain, infections or trauma to the area.  Physical exam consistent with subcutaneous cyst versus lipoma.  Previous PET scan indicates possible inclusion cyst.  Patient presents today for excision of the mass.  Reviewed the nature of the procedure as well as associated risks.  The patient and his family expressed understanding.  The patient elects to proceed with surgery.    Operative Findings:  1.5 cm translucent subcutaneous cyst with serous fluid contents.  Moderate degree of inflammatory tissue around the base of the cyst.       Complications:   None    Procedure and Technique:  Patient was identified in the preoperative holding area.  The left frontotemporal mass was palpated and its footprint marked with surgical marker.  The patient was then taken back to the operating room where he was placed on the operating room table in the supine position with the head of bed elevated to 45  degrees.  A surgical timeout was performed confirming the patient's identity, the procedure to be performed, and the laterality of this procedure.  All were in agreement.  The skin overlying the left frontotemporal mass was gently cleansed with an alcohol swab.  10 cc of 1% lidocaine with 1-100,000 epinephrine was injected into the skin surrounding and overlying the mass.  The area was then prepped with Betadine paint and draped in usual sterile fashion.  Drapes were tented as do not rest on the patient's face and allowed to breathe easily.  The area was tested for analgesia with a pinch test.  Patient noted no pain.  A 1.5 cm incision was designed over the surface of the subcutaneous mass.  This was incised with a 15 blade.  With a 15 blade incision was made through the skin and dermis exposing underlying cystic structure with relatively translucent capsule/walls.  A combination of blunt dissection and monopolar electrocautery was used to dissect the cyst from the surrounding tissue.  Surrounding tissue was relatively inflamed and there was not a very clean plane between the cyst and surrounding tissue.  Unfortunately the cyst ruptured emptying a small volume clear fluid without any debris.  The collapsed cyst was then dissected out from the surrounding tissue with the monopolar cautery.  A 5 mm ring of inflammatory tissue at the base of the wound was also dissected and removed and sent for pathology.  The wound was then irrigated with copious sterile saline.  Meticulous hemostasis was achieved.  The wound was then closed in 2 layers.  3-0 Vicryl suture was used in the deep dermis in simple operative fashion followed by 4-0 Monocryl in a running subcuticular fashion.  Total length of incision 1.5 cm.  The wound was then cleaned and dressed with half-inch Steri-Strips and covered with a gauze/Kerlix pressure dressing.    The patient tolerated the procedure well without issue.  There were no complications.  All  counts were correct at the end of the case.  I was present and scrubbed for the entire case.  There were 2 specimen sent, the cystic structure as well as the inflammatory tissue.  The patient was returned to the [facility]U in stable condition.      I was present for the entire procedure.    Patient Disposition:  [facility]    This procedure was not performed to treat primary cutaneous melanoma through wide local excision       SIGNATURE: Freddy Castle MD  DATE: August 8, 2025  TIME: 7:54 AM

## 2025-08-13 ENCOUNTER — TELEPHONE (OUTPATIENT)
Dept: PLASTIC SURGERY | Facility: CLINIC | Age: 70
End: 2025-08-13

## 2025-08-18 ENCOUNTER — TELEPHONE (OUTPATIENT)
Dept: PLASTIC SURGERY | Facility: CLINIC | Age: 70
End: 2025-08-18

## (undated) DEVICE — Device

## (undated) DEVICE — INSULATED BLADE ELECTRODE: Brand: EDGE

## (undated) DEVICE — INSULATED NEEDLE ELECTRODE SAFETY SLEEVE(TM): Brand: EDGE

## (undated) DEVICE — INTENDED FOR TISSUE SEPARATION, AND OTHER PROCEDURES THAT REQUIRE A SHARP SURGICAL BLADE TO PUNCTURE OR CUT.: Brand: BARD-PARKER ® CARBON RIB-BACK BLADES

## (undated) DEVICE — POV-IOD SOLUTION 4OZ BT

## (undated) DEVICE — SYRINGE EAR BULB PEEL POUCH 2 OZ ULCER

## (undated) DEVICE — SLIM BODY SKIN STAPLER: Brand: APPOSE ULC

## (undated) DEVICE — PACK PBDS PLASTIC HEAD AND NECK RF

## (undated) DEVICE — PREMIUM DRY TRAY LF: Brand: MEDLINE INDUSTRIES, INC.